# Patient Record
Sex: FEMALE | Race: WHITE | NOT HISPANIC OR LATINO | Employment: UNEMPLOYED | URBAN - METROPOLITAN AREA
[De-identification: names, ages, dates, MRNs, and addresses within clinical notes are randomized per-mention and may not be internally consistent; named-entity substitution may affect disease eponyms.]

---

## 2023-10-09 ENCOUNTER — HOSPITAL ENCOUNTER (OUTPATIENT)
Dept: CARDIOLOGY | Facility: CLINIC | Age: 48
Discharge: HOME | End: 2023-10-09
Attending: INTERNAL MEDICINE | Admitting: INTERNAL MEDICINE
Payer: COMMERCIAL

## 2023-10-09 DIAGNOSIS — Z00.6 EXAMINATION OF PARTICIPANT IN CLINICAL TRIAL: ICD-10-CM

## 2023-10-09 LAB — LVEF ECHO: NORMAL

## 2023-10-09 PROCEDURE — 93306 TTE W/DOPPLER COMPLETE: CPT | Mod: 26 | Performed by: INTERNAL MEDICINE

## 2023-10-09 PROCEDURE — 255N000002 HC RX 255 OP 636: Performed by: INTERNAL MEDICINE

## 2023-10-09 RX ADMIN — PERFLUTREN 3 ML: 6.52 INJECTION, SUSPENSION INTRAVENOUS at 09:40

## 2023-10-16 ENCOUNTER — TRANSCRIBE ORDERS (OUTPATIENT)
Dept: CARDIOLOGY | Facility: CLINIC | Age: 48
End: 2023-10-16
Payer: COMMERCIAL

## 2023-10-16 DIAGNOSIS — G89.3 CANCER ASSOCIATED PAIN: Primary | ICD-10-CM

## 2023-10-18 ENCOUNTER — HOSPITAL ENCOUNTER (OUTPATIENT)
Dept: CARDIOLOGY | Facility: CLINIC | Age: 48
Discharge: HOME | End: 2023-10-18
Attending: INTERNAL MEDICINE | Admitting: INTERNAL MEDICINE
Payer: COMMERCIAL

## 2023-10-18 LAB
ATRIAL RATE - MUSE: 76 BPM
DIASTOLIC BLOOD PRESSURE - MUSE: NORMAL MMHG
INTERPRETATION ECG - MUSE: NORMAL
P AXIS - MUSE: 61 DEGREES
PR INTERVAL - MUSE: 166 MS
QRS DURATION - MUSE: 60 MS
QT - MUSE: 398 MS
QTC - MUSE: 447 MS
R AXIS - MUSE: 71 DEGREES
SYSTOLIC BLOOD PRESSURE - MUSE: NORMAL MMHG
T AXIS - MUSE: 63 DEGREES
VENTRICULAR RATE- MUSE: 76 BPM

## 2023-10-18 PROCEDURE — 93005 ELECTROCARDIOGRAM TRACING: CPT

## 2023-10-18 PROCEDURE — 93010 ELECTROCARDIOGRAM REPORT: CPT | Mod: RTG | Performed by: INTERNAL MEDICINE

## 2023-12-21 ENCOUNTER — HOSPITAL ENCOUNTER (OUTPATIENT)
Dept: CT IMAGING | Facility: HOSPITAL | Age: 48
Discharge: HOME | End: 2023-12-21
Attending: INTERNAL MEDICINE | Admitting: INTERNAL MEDICINE
Payer: COMMERCIAL

## 2023-12-21 DIAGNOSIS — R94.5 NONSPECIFIC ABNORMAL RESULTS OF LIVER FUNCTION STUDY: ICD-10-CM

## 2023-12-21 DIAGNOSIS — C50.412 MALIGNANT NEOPLASM OF UPPER-OUTER QUADRANT OF LEFT FEMALE BREAST (H): ICD-10-CM

## 2023-12-21 DIAGNOSIS — C78.7 SECONDARY MALIGNANT NEOPLASM OF LIVER (H): ICD-10-CM

## 2023-12-21 PROCEDURE — 74177 CT ABD & PELVIS W/CONTRAST: CPT

## 2023-12-21 PROCEDURE — 250N000011 HC RX IP 250 OP 636: Performed by: INTERNAL MEDICINE

## 2023-12-21 RX ORDER — IOPAMIDOL 755 MG/ML
90 INJECTION, SOLUTION INTRAVASCULAR ONCE
Status: COMPLETED | OUTPATIENT
Start: 2023-12-21 | End: 2023-12-21

## 2023-12-21 RX ADMIN — IOPAMIDOL 90 ML: 755 INJECTION, SOLUTION INTRAVENOUS at 13:02

## 2023-12-31 ENCOUNTER — HEALTH MAINTENANCE LETTER (OUTPATIENT)
Age: 48
End: 2023-12-31

## 2024-11-08 ENCOUNTER — APPOINTMENT (OUTPATIENT)
Dept: ULTRASOUND IMAGING | Facility: CLINIC | Age: 49
End: 2024-11-08
Attending: EMERGENCY MEDICINE
Payer: COMMERCIAL

## 2024-11-08 ENCOUNTER — HOSPITAL ENCOUNTER (EMERGENCY)
Facility: CLINIC | Age: 49
Discharge: HOME | End: 2024-11-08
Attending: EMERGENCY MEDICINE | Admitting: EMERGENCY MEDICINE
Payer: COMMERCIAL

## 2024-11-08 VITALS
HEIGHT: 63 IN | HEART RATE: 97 BPM | RESPIRATION RATE: 16 BRPM | TEMPERATURE: 97.8 F | WEIGHT: 142 LBS | BODY MASS INDEX: 25.16 KG/M2 | OXYGEN SATURATION: 98 % | SYSTOLIC BLOOD PRESSURE: 97 MMHG | DIASTOLIC BLOOD PRESSURE: 60 MMHG

## 2024-11-08 DIAGNOSIS — R18.0 MALIGNANT ASCITES (H): ICD-10-CM

## 2024-11-08 LAB
% LINING CELLS, BODY FLUID: 11 %
ABSOLUTE NEUTROPHILS, BODY FLUID: 1.1 /UL
ALBUMIN SERPL BCG-MCNC: 2.6 G/DL (ref 3.5–5.2)
ALP SERPL-CCNC: 199 U/L (ref 40–150)
ALT SERPL W P-5'-P-CCNC: 9 U/L (ref 0–50)
ANION GAP SERPL CALCULATED.3IONS-SCNC: 14 MMOL/L (ref 7–15)
APPEARANCE FLD: CLEAR
AST SERPL W P-5'-P-CCNC: 79 U/L (ref 0–45)
BILIRUB DIRECT SERPL-MCNC: 0.84 MG/DL (ref 0–0.3)
BILIRUB SERPL-MCNC: 1.9 MG/DL
BUN SERPL-MCNC: 4.7 MG/DL (ref 6–20)
CALCIUM SERPL-MCNC: 8.8 MG/DL (ref 8.8–10.4)
CELL COUNT BODY FLUID SOURCE: NORMAL
CHLORIDE SERPL-SCNC: 97 MMOL/L (ref 98–107)
COLOR FLD: YELLOW
CREAT SERPL-MCNC: 0.81 MG/DL (ref 0.51–0.95)
EGFRCR SERPLBLD CKD-EPI 2021: 90 ML/MIN/1.73M2
ERYTHROCYTE [DISTWIDTH] IN BLOOD BY AUTOMATED COUNT: 13.6 % (ref 10–15)
GLUCOSE SERPL-MCNC: 102 MG/DL (ref 70–99)
HCO3 SERPL-SCNC: 23 MMOL/L (ref 22–29)
HCT VFR BLD AUTO: 36.3 % (ref 35–47)
HGB BLD-MCNC: 12.8 G/DL (ref 11.7–15.7)
LIPASE SERPL-CCNC: 19 U/L (ref 13–60)
LYMPHOCYTES NFR FLD MANUAL: 5 %
MCH RBC QN AUTO: 40.9 PG (ref 26.5–33)
MCHC RBC AUTO-ENTMCNC: 35.3 G/DL (ref 31.5–36.5)
MCV RBC AUTO: 116 FL (ref 78–100)
MONOS+MACROS NFR FLD MANUAL: 77 %
NEUTS BAND NFR FLD MANUAL: 4 %
OTHER CELLS FLD MANUAL: 3 %
PATH REV: NORMAL
PLATELET # BLD AUTO: 208 10E3/UL (ref 150–450)
POTASSIUM SERPL-SCNC: 3.5 MMOL/L (ref 3.4–5.3)
PROT SERPL-MCNC: 5.6 G/DL (ref 6.4–8.3)
RBC # BLD AUTO: 3.13 10E6/UL (ref 3.8–5.2)
SODIUM SERPL-SCNC: 134 MMOL/L (ref 135–145)
WBC # BLD AUTO: 7.5 10E3/UL (ref 4–11)
WBC # FLD AUTO: 28 /UL

## 2024-11-08 PROCEDURE — 80048 BASIC METABOLIC PNL TOTAL CA: CPT | Performed by: EMERGENCY MEDICINE

## 2024-11-08 PROCEDURE — 99285 EMERGENCY DEPT VISIT HI MDM: CPT | Mod: 25

## 2024-11-08 PROCEDURE — 250N000011 HC RX IP 250 OP 636: Performed by: EMERGENCY MEDICINE

## 2024-11-08 PROCEDURE — 36415 COLL VENOUS BLD VENIPUNCTURE: CPT | Performed by: EMERGENCY MEDICINE

## 2024-11-08 PROCEDURE — 272N000042 US PARACENTESIS WITHOUT ALBUMIN

## 2024-11-08 PROCEDURE — 85014 HEMATOCRIT: CPT | Performed by: EMERGENCY MEDICINE

## 2024-11-08 PROCEDURE — 89051 BODY FLUID CELL COUNT: CPT | Performed by: EMERGENCY MEDICINE

## 2024-11-08 PROCEDURE — 96374 THER/PROPH/DIAG INJ IV PUSH: CPT

## 2024-11-08 PROCEDURE — 82947 ASSAY GLUCOSE BLOOD QUANT: CPT | Performed by: EMERGENCY MEDICINE

## 2024-11-08 PROCEDURE — 49083 ABD PARACENTESIS W/IMAGING: CPT

## 2024-11-08 PROCEDURE — 82248 BILIRUBIN DIRECT: CPT | Performed by: EMERGENCY MEDICINE

## 2024-11-08 PROCEDURE — 83690 ASSAY OF LIPASE: CPT | Performed by: EMERGENCY MEDICINE

## 2024-11-08 PROCEDURE — 272N000710 US PARACENTESIS WITHOUT ALBUMIN

## 2024-11-08 RX ORDER — ONDANSETRON 4 MG/1
4 TABLET, ORALLY DISINTEGRATING ORAL EVERY 8 HOURS PRN
Qty: 10 TABLET | Refills: 0 | Status: ON HOLD | OUTPATIENT
Start: 2024-11-08 | End: 2024-11-14

## 2024-11-08 RX ORDER — ONDANSETRON 2 MG/ML
4 INJECTION INTRAMUSCULAR; INTRAVENOUS ONCE
Status: COMPLETED | OUTPATIENT
Start: 2024-11-08 | End: 2024-11-08

## 2024-11-08 RX ADMIN — ONDANSETRON 4 MG: 2 INJECTION, SOLUTION INTRAMUSCULAR; INTRAVENOUS at 12:49

## 2024-11-08 ASSESSMENT — COLUMBIA-SUICIDE SEVERITY RATING SCALE - C-SSRS
1. IN THE PAST MONTH, HAVE YOU WISHED YOU WERE DEAD OR WISHED YOU COULD GO TO SLEEP AND NOT WAKE UP?: NO
2. HAVE YOU ACTUALLY HAD ANY THOUGHTS OF KILLING YOURSELF IN THE PAST MONTH?: NO
6. HAVE YOU EVER DONE ANYTHING, STARTED TO DO ANYTHING, OR PREPARED TO DO ANYTHING TO END YOUR LIFE?: NO

## 2024-11-08 ASSESSMENT — ACTIVITIES OF DAILY LIVING (ADL)
ADLS_ACUITY_SCORE: 0

## 2024-11-08 NOTE — DISCHARGE INSTRUCTIONS
Fluid was drained from your abdomen today.  It is important that you follow-up with your oncologist next week as scheduled.  Return to the ER sooner for any worsening symptoms or other concerns.

## 2024-11-08 NOTE — ED PROVIDER NOTES
EMERGENCY DEPARTMENT ENCOUnter      NAME: Emilie Urena  AGE: 49 year old female  YOB: 1975  MRN: [id number]  EVALUATION DATE & TIME: 11/8/2024 10:42 AM    PCP: No Ref-Primary, Physician    ED PROVIDER: Sohan Christine DO      Chief Complaint   Patient presents with    Bloated    Abdominal Pain         FINAL IMPRESSION:  1. Malignant ascites (H)          ED COURSE & MEDICAL DECISION MAKING:    10:45 AM I met with the patient, obtained history, performed an initial exam, and discussed options and plan for diagnostics and treatment here in the ED.    The patient presented to the emergency department today complaining of abdominal pain.  She has a history of metastatic breast cancer with mets to the liver.  She has never required a paracentesis in the past but appears to have a large amount of ascites buildup today based on her abdominal distention.  Laboratory testing is notable for mild elevations in liver enzymes and bilirubin.  She was sent for paracentesis and greater than 2 L were removed.  She is feeling much better after this.  Her white count is normal and at this point I am not suspicious for SBP.  She has a follow-up appointment coming up next week with her oncologist.  I have encouraged her to keep this and return sooner for worsening symptoms or other concerns.  She is comfortable with the plan for discharge home.        Medical Decision Making  Obtained supplemental history:Supplemental history obtained?: Documented in chart  Reviewed external records: External records reviewed?: Documented in chart  Care impacted by chronic illness:Cancer/Chemotherapy and Other: GERD  Did you consider but not order tests?: Work up considered but not performed and documented in chart, if applicable  Did you interpret images independently?: Independent interpretation of ECG and images noted in documentation, when applicable.  Consultation discussion with other provider:Did you involve another provider  "(consultant, MH, pharmacy, etc.)?: No  Discharge. I prescribed additional prescription strength medication(s) as charted. I considered admission, but discharged patient after significant clinical improvement.    MIPS: Not Applicable      At the conclusion of the encounter I discussed the results of all of the tests and the disposition. The questions were answered. The patient or family acknowledged understanding and was agreeable with the care plan.         MEDICATIONS GIVEN IN THE EMERGENCY:  Medications   ondansetron (ZOFRAN) injection 4 mg (4 mg Intravenous $Given 11/8/24 7266)       NEW PRESCRIPTIONS STARTED AT TODAY'S ER VISIT  New Prescriptions    ONDANSETRON (ZOFRAN ODT) 4 MG ODT TAB    Take 1 tablet (4 mg) by mouth every 8 hours as needed for nausea.          =================================================================    HPI        Emilie Urena is a 49 year old female with a pertinent history of metastatic breast cancer, chemotherapy induced neutropenia, chemotherapy-induced thrombocytopenia, transaminitis, S/P hysterectomy, and GERD, who presents to this ED via private car for evaluation of swelling, nausea, and abdominal pain.    Patient reports she has ascites. She just had a colonoscopy and endoscopy and notes she didn't feel better after these procedures. She has been dealing with persistent nausea and dizziness before she underwent an EGD and colonoscopy and continue to have this. She noticed some swelling in her arms, legs, feet, and abdomen that has been worsening over the past 4 weeks, noting this is a new symptom. She endorses abdominal bloating and notes feeling like she is \"6 months pregnant\". No fever or chills. Per friend, the results of the EGD and colonoscopy showed the patient had a \"big inflammation in the stomach\". She indicates she isn't quite active and endorses slight shortness of breath with exertion. No shortness of breath at rest. She also feels generally weak     She has " had metastatic breast cancer the past 4 years and reports this has been worsening the past 6 weeks. She states this cancer metastasized to the bone and her liver. She follows up with her oncologist, Dr. Henny Pineda at [facility].      PAST MEDICAL HISTORY:  No past medical history on file.    PAST SURGICAL HISTORY:  No past surgical history on file.        CURRENT MEDICATIONS:    ondansetron (ZOFRAN ODT) 4 MG ODT tab        ALLERGIES:  Allergies   Allergen Reactions    Azithromycin Hives     Hives to bilateral arms     Hives to bilateral arms    Bee Venom Swelling     Severe swelling, locally.    Sertraline Nausea and Vomiting     Severe nausea    Shellfish-Derived Products Anaphylaxis    Adhesive Tape Rash     Skin Glue at port site placement.       FAMILY HISTORY:  No family history on file.    SOCIAL HISTORY:   Social History     Socioeconomic History    Marital status:      Social Drivers of Health     Financial Resource Strain: Low Risk  (5/19/2021)    Received from [facility], and [facility], [email or web address], and [facility]    Overall Financial Resource Strain (CARDIA)     Difficulty of Paying Living Expenses: Not hard at all   Food Insecurity: No Food Insecurity (5/19/2021)    Received from [facility], and [facility], [facility] [email or web address], and [facility]    Hunger Vital Sign     Worried About Running Out of Food in the Last Year: Never true     Ran Out of Food in the Last Year: Never true   Transportation Needs: No Transportation Needs (5/19/2021)    Received from [facility], and [facility], [email or web address], and [facility]    PRAPARE - Transportation     Lack of Transportation (Medical): No     Lack of Transportation (Non-Medical): No   Physical Activity: Insufficiently Active (5/19/2021)    Received from [facility], and [facility], [email or web address], and [facility]    Exercise Vital Sign     Days of Exercise  "per Week: 1 day     Minutes of Exercise per Session: 20 min   Stress: Stress Concern Present (5/19/2021)    [facility] [employer], [facility] [employer] [facility], and [facility]    [employer] [place] [employer] - Occupational Stress Questionnaire     Feeling of Stress : To some extent   Social Connections: Moderately Integrated (5/19/2021)    [facility] [employer], and [facility], [email or web address], and [facility]    Social Connection and Isolation Panel [NHANES]     Frequency of Communication with Friends and Family: More than three times a week     Frequency of Social Gatherings with Friends and Family: Three times a week     Attends Temple Services: 1 to 4 times per year     Active Member of Clubs or Organizations: No     Attends Club or Organization Meetings: Never     Marital Status:    Interpersonal Safety: Not At Risk (5/19/2021)    Received from [employer], and [facility], [email or web address], and [facility]    Humiliation, Afraid, Rape, and Kick questionnaire     Fear of Current or Ex-Partner: No     Emotionally Abused: No     Physically Abused: No     Sexually Abused: No   Housing Stability: Low Risk  (5/19/2021)    Received from [employer], and [facility], [email or web address], and [facility]    Housing Stability Vital Sign     Unable to Pay for Housing in the Last Year: No     Number of Places Lived in the Last Year: 1     Unstable Housing in the Last Year: No       VITALS:  Patient Vitals for the past 24 hrs:   BP Temp Temp src Pulse Resp SpO2 Height Weight   11/08/24 1034 115/79 97.8  F (36.6  C) Oral 115 16 96 % 1.6 m (5' 3\") 64.4 kg (142 lb)       PHYSICAL EXAM    Constitutional:  Well developed, Well nourished,  HENT:  Normocephalic, Atraumatic, Oropharynx moist, Nose normal.   Eyes:  EOMI, Conjunctiva normal, No discharge.   Respiratory:  Normal breath sounds, No respiratory distress, No " wheezing, No chest tenderness.   Cardiovascular: Mild tachycardia, Normal rhythm, No murmurs  GI: Somewhat firm, mild diffuse tenderness, no guarding.  Musculoskeletal:  No tenderness to palpation or major deformities noted.   Extremities: No lower extremity edema.  Neurologic:  Alert & oriented x 3, No focal deficits noted.   Psychiatric:  Affect normal, Judgment normal, Mood normal.        LAB:  All pertinent labs reviewed and interpreted.  Results for orders placed or performed during the hospital encounter of 11/08/24              CBC with platelets   Result Value Ref Range    WBC Count 7.5 4.0 - 11.0 10e3/uL    RBC Count 3.13 (L) 3.80 - 5.20 10e6/uL    Hemoglobin 12.8 11.7 - 15.7 g/dL    Hematocrit 36.3 35.0 - 47.0 %     (H) 78 - 100 fL    MCH 40.9 (H) 26.5 - 33.0 pg    MCHC 35.3 31.5 - 36.5 g/dL    RDW 13.6 10.0 - 15.0 %    Platelet Count 208 150 - 450 10e3/uL   Basic metabolic panel   Result Value Ref Range    Sodium 134 (L) 135 - 145 mmol/L    Potassium 3.5 3.4 - 5.3 mmol/L    Chloride 97 (L) 98 - 107 mmol/L    Carbon Dioxide (CO2) 23 22 - 29 mmol/L    Anion Gap 14 7 - 15 mmol/L    Urea Nitrogen 4.7 (L) 6.0 - 20.0 mg/dL    Creatinine 0.81 0.51 - 0.95 mg/dL    GFR Estimate 90 >60 mL/min/1.73m2    Calcium 8.8 8.8 - 10.4 mg/dL    Glucose 102 (H) 70 - 99 mg/dL   Hepatic function panel   Result Value Ref Range    Protein Total 5.6 (L) 6.4 - 8.3 g/dL    Albumin 2.6 (L) 3.5 - 5.2 g/dL    Bilirubin Total 1.9 (H) <=1.2 mg/dL    Alkaline Phosphatase 199 (H) 40 - 150 U/L    AST 79 (H) 0 - 45 U/L    ALT 9 0 - 50 U/L    Bilirubin Direct 0.84 (H) 0.00 - 0.30 mg/dL   Result Value Ref Range    Lipase 19 13 - 60 U/L   Cell Count Body Fluid   Result Value Ref Range    Color Yellow Colorless, Yellow    Clarity Clear Clear    Cell Count Fluid Source Abdomen     Total Nucleated Cells 28 /uL       RADIOLOGY:  I have independently reviewed and interpreted the above imaging, pending the final radiology read.  US Paracentesis  without Albumin   Final Result   IMPRESSION:   1.  Status post ultrasound-guided paracentesis.      Reference CPT Code: 81485            I, Carolyn Candelaria, am serving as a scribe to document services personally performed by Dr. Christine based on my observation and the provider's statements to me. I, Sohan Christine, DO attest that Carolyn Candelaria is acting in a scribe capacity, has observed my performance of the services and has documented them in accordance with my direction.    Sohan Christine DO  Emergency Medicine  [facility] EMERGENCY ROOM  [facility] [facility] [id number]  [phone]  Dept: [phone]     Sohan Christine DO  11/08/24 1334

## 2024-11-08 NOTE — ED TRIAGE NOTES
"Pt presents to the ED with complaints of swelling in her arms, legs and abdomen that has been getting worse over the last 4 weeks. Abdominal swelling causing discomfort and pt having trouble keeping food and fluids down. + nausea and dizziness. Pt states she will drink and \"throw it right back up\". Hx of metastatic breast cancer. Pt states increase in weight from 127 lbs to 142 lbs. Had a colonoscopy and endoscopy yesterday which pt stated her stomach was \"inflamed\".     Triage Assessment (Adult)       Row Name 11/08/24 1037          Triage Assessment    Airway WDL WDL        Respiratory WDL    Respiratory WDL WDL        Skin Circulation/Temperature WDL    Skin Circulation/Temperature WDL WDL        Cardiac WDL    Cardiac WDL X;rhythm     Pulse Rate & Regularity tachycardic        Peripheral/Neurovascular WDL    Peripheral Neurovascular WDL WDL        Cognitive/Neuro/Behavioral WDL    Cognitive/Neuro/Behavioral WDL WDL                     "

## 2024-11-11 ENCOUNTER — APPOINTMENT (OUTPATIENT)
Dept: CT IMAGING | Facility: CLINIC | Age: 49
End: 2024-11-11
Attending: EMERGENCY MEDICINE
Payer: COMMERCIAL

## 2024-11-11 ENCOUNTER — HOSPITAL ENCOUNTER (INPATIENT)
Facility: HOSPITAL | Age: 49
LOS: 2 days | Discharge: HOME | DRG: 435 | End: 2024-11-14
Attending: HOSPITALIST | Admitting: HOSPITALIST
Payer: COMMERCIAL

## 2024-11-11 ENCOUNTER — HOSPITAL ENCOUNTER (EMERGENCY)
Facility: CLINIC | Age: 49
Discharge: ANOTHER HOSPITAL | End: 2024-11-11
Attending: EMERGENCY MEDICINE | Admitting: EMERGENCY MEDICINE
Payer: COMMERCIAL

## 2024-11-11 VITALS
HEIGHT: 63 IN | BODY MASS INDEX: 25.16 KG/M2 | HEART RATE: 102 BPM | DIASTOLIC BLOOD PRESSURE: 73 MMHG | OXYGEN SATURATION: 98 % | RESPIRATION RATE: 16 BRPM | WEIGHT: 142 LBS | TEMPERATURE: 98 F | SYSTOLIC BLOOD PRESSURE: 108 MMHG

## 2024-11-11 DIAGNOSIS — R18.0 MALIGNANT ASCITES (H): ICD-10-CM

## 2024-11-11 DIAGNOSIS — R62.7 FAILURE TO THRIVE IN ADULT: Primary | ICD-10-CM

## 2024-11-11 DIAGNOSIS — R60.1 ANASARCA: ICD-10-CM

## 2024-11-11 DIAGNOSIS — R11.0 NAUSEA: ICD-10-CM

## 2024-11-11 DIAGNOSIS — R19.7 DIARRHEA, UNSPECIFIED TYPE: ICD-10-CM

## 2024-11-11 PROBLEM — E87.1 HYPONATREMIA: Status: ACTIVE | Noted: 2024-11-11

## 2024-11-11 PROBLEM — G62.9 PERIPHERAL POLYNEUROPATHY: Status: ACTIVE | Noted: 2024-11-11

## 2024-11-11 PROBLEM — C50.919: Status: ACTIVE | Noted: 2024-11-11

## 2024-11-11 LAB
ALBUMIN SERPL BCG-MCNC: 2.7 G/DL (ref 3.5–5.2)
ALBUMIN SERPL BCG-MCNC: 2.8 G/DL (ref 3.5–5.2)
ALP SERPL-CCNC: 211 U/L (ref 40–150)
ALP SERPL-CCNC: 227 U/L (ref 40–150)
ALT SERPL W P-5'-P-CCNC: 12 U/L (ref 0–50)
ALT SERPL W P-5'-P-CCNC: ABNORMAL U/L
ANION GAP SERPL CALCULATED.3IONS-SCNC: 1 MMOL/L (ref 7–15)
ANION GAP SERPL CALCULATED.3IONS-SCNC: 14 MMOL/L (ref 7–15)
APTT PPP: 31 SECONDS (ref 22–38)
AST SERPL W P-5'-P-CCNC: 103 U/L (ref 0–45)
AST SERPL W P-5'-P-CCNC: ABNORMAL U/L
B-OH-BUTYR SERPL-SCNC: 2.8 MMOL/L
BASE EXCESS BLDV CALC-SCNC: 3.1 MMOL/L (ref -3–3)
BASOPHILS # BLD AUTO: 0 10E3/UL (ref 0–0.2)
BASOPHILS NFR BLD AUTO: 1 %
BILIRUB DIRECT SERPL-MCNC: 1.25 MG/DL (ref 0–0.3)
BILIRUB SERPL-MCNC: 2 MG/DL
BILIRUB SERPL-MCNC: 2.2 MG/DL
BUN SERPL-MCNC: 7.8 MG/DL (ref 6–20)
BUN SERPL-MCNC: 8.4 MG/DL (ref 6–20)
CALCIUM SERPL-MCNC: 7.8 MG/DL (ref 8.8–10.4)
CALCIUM SERPL-MCNC: 9.6 MG/DL (ref 8.8–10.4)
CHLORIDE SERPL-SCNC: 91 MMOL/L (ref 98–107)
CHLORIDE SERPL-SCNC: 96 MMOL/L (ref 98–107)
CREAT SERPL-MCNC: 0.66 MG/DL (ref 0.51–0.95)
CREAT SERPL-MCNC: 0.79 MG/DL (ref 0.51–0.95)
EGFRCR SERPLBLD CKD-EPI 2021: >90 ML/MIN/1.73M2
EGFRCR SERPLBLD CKD-EPI 2021: >90 ML/MIN/1.73M2
EOSINOPHIL # BLD AUTO: 0.2 10E3/UL (ref 0–0.7)
EOSINOPHIL NFR BLD AUTO: 3 %
ERYTHROCYTE [DISTWIDTH] IN BLOOD BY AUTOMATED COUNT: 14.5 % (ref 10–15)
GLUCOSE BLDC GLUCOMTR-MCNC: 116 MG/DL (ref 70–99)
GLUCOSE SERPL-MCNC: 80 MG/DL (ref 70–99)
GLUCOSE SERPL-MCNC: 86 MG/DL (ref 70–99)
HCO3 BLDV-SCNC: 29 MMOL/L (ref 21–28)
HCO3 SERPL-SCNC: 23 MMOL/L (ref 22–29)
HCO3 SERPL-SCNC: 23 MMOL/L (ref 22–29)
HCT VFR BLD AUTO: 40.1 % (ref 35–47)
HGB BLD-MCNC: 14.2 G/DL (ref 11.7–15.7)
HOLD SPECIMEN: NORMAL
IMM GRANULOCYTES # BLD: 0 10E3/UL
IMM GRANULOCYTES NFR BLD: 1 %
INR PPP: 1.54 (ref 0.85–1.15)
LACTATE SERPL-SCNC: 2.2 MMOL/L (ref 0.7–2)
LIPASE SERPL-CCNC: 29 U/L (ref 13–60)
LYMPHOCYTES # BLD AUTO: 0.7 10E3/UL (ref 0.8–5.3)
LYMPHOCYTES NFR BLD AUTO: 12 %
MAGNESIUM SERPL-MCNC: 1.9 MG/DL (ref 1.7–2.3)
MCH RBC QN AUTO: 41 PG (ref 26.5–33)
MCHC RBC AUTO-ENTMCNC: 35.4 G/DL (ref 31.5–36.5)
MCV RBC AUTO: 116 FL (ref 78–100)
MONOCYTES # BLD AUTO: 0.6 10E3/UL (ref 0–1.3)
MONOCYTES NFR BLD AUTO: 9 %
NEUTROPHILS # BLD AUTO: 4.5 10E3/UL (ref 1.6–8.3)
NEUTROPHILS NFR BLD AUTO: 75 %
NRBC # BLD AUTO: 0 10E3/UL
NRBC BLD AUTO-RTO: 0 /100
O2/TOTAL GAS SETTING VFR VENT: 21 %
OXYHGB MFR BLDV: 19 % (ref 70–75)
PCO2 BLDV: 45 MM HG (ref 40–50)
PH BLDV: 7.41 [PH] (ref 7.32–7.43)
PHOSPHATE SERPL-MCNC: 2.9 MG/DL (ref 2.5–4.5)
PLATELET # BLD AUTO: 229 10E3/UL (ref 150–450)
PO2 BLDV: 18 MM HG (ref 25–47)
POTASSIUM SERPL-SCNC: 3.5 MMOL/L (ref 3.4–5.3)
POTASSIUM SERPL-SCNC: >10 MMOL/L (ref 3.4–5.3)
PROT SERPL-MCNC: 6 G/DL (ref 6.4–8.3)
PROT SERPL-MCNC: 7.5 G/DL (ref 6.4–8.3)
RBC # BLD AUTO: 3.46 10E6/UL (ref 3.8–5.2)
SAO2 % BLDV: 19.3 % (ref 70–75)
SODIUM SERPL-SCNC: 115 MMOL/L (ref 135–145)
SODIUM SERPL-SCNC: 133 MMOL/L (ref 135–145)
T4 FREE SERPL-MCNC: 1.53 NG/DL (ref 0.9–1.7)
TSH SERPL DL<=0.005 MIU/L-ACNC: 4.7 UIU/ML (ref 0.3–4.2)
WBC # BLD AUTO: 6 10E3/UL (ref 4–11)

## 2024-11-11 PROCEDURE — 82805 BLOOD GASES W/O2 SATURATION: CPT | Performed by: EMERGENCY MEDICINE

## 2024-11-11 PROCEDURE — G0378 HOSPITAL OBSERVATION PER HR: HCPCS

## 2024-11-11 PROCEDURE — 82247 BILIRUBIN TOTAL: CPT | Performed by: EMERGENCY MEDICINE

## 2024-11-11 PROCEDURE — 99285 EMERGENCY DEPT VISIT HI MDM: CPT | Mod: 25

## 2024-11-11 PROCEDURE — 85025 COMPLETE CBC W/AUTO DIFF WBC: CPT | Performed by: EMERGENCY MEDICINE

## 2024-11-11 PROCEDURE — 36415 COLL VENOUS BLD VENIPUNCTURE: CPT | Performed by: HOSPITALIST

## 2024-11-11 PROCEDURE — 250N000013 HC RX MED GY IP 250 OP 250 PS 637: Performed by: HOSPITALIST

## 2024-11-11 PROCEDURE — 85610 PROTHROMBIN TIME: CPT | Performed by: EMERGENCY MEDICINE

## 2024-11-11 PROCEDURE — 250N000011 HC RX IP 250 OP 636: Performed by: EMERGENCY MEDICINE

## 2024-11-11 PROCEDURE — 85014 HEMATOCRIT: CPT | Performed by: EMERGENCY MEDICINE

## 2024-11-11 PROCEDURE — 36415 COLL VENOUS BLD VENIPUNCTURE: CPT | Performed by: EMERGENCY MEDICINE

## 2024-11-11 PROCEDURE — 84443 ASSAY THYROID STIM HORMONE: CPT | Performed by: EMERGENCY MEDICINE

## 2024-11-11 PROCEDURE — 96374 THER/PROPH/DIAG INJ IV PUSH: CPT

## 2024-11-11 PROCEDURE — 258N000003 HC RX IP 258 OP 636: Performed by: HOSPITALIST

## 2024-11-11 PROCEDURE — 82010 KETONE BODYS QUAN: CPT | Performed by: HOSPITALIST

## 2024-11-11 PROCEDURE — 84100 ASSAY OF PHOSPHORUS: CPT | Performed by: HOSPITALIST

## 2024-11-11 PROCEDURE — 250N000011 HC RX IP 250 OP 636: Performed by: HOSPITALIST

## 2024-11-11 PROCEDURE — 96372 THER/PROPH/DIAG INJ SC/IM: CPT | Performed by: HOSPITALIST

## 2024-11-11 PROCEDURE — 99223 1ST HOSP IP/OBS HIGH 75: CPT | Performed by: HOSPITALIST

## 2024-11-11 PROCEDURE — 84075 ASSAY ALKALINE PHOSPHATASE: CPT | Performed by: EMERGENCY MEDICINE

## 2024-11-11 PROCEDURE — 82962 GLUCOSE BLOOD TEST: CPT

## 2024-11-11 PROCEDURE — 83605 ASSAY OF LACTIC ACID: CPT | Performed by: EMERGENCY MEDICINE

## 2024-11-11 PROCEDURE — 84155 ASSAY OF PROTEIN SERUM: CPT | Performed by: EMERGENCY MEDICINE

## 2024-11-11 PROCEDURE — 82248 BILIRUBIN DIRECT: CPT | Performed by: EMERGENCY MEDICINE

## 2024-11-11 PROCEDURE — 83690 ASSAY OF LIPASE: CPT | Performed by: EMERGENCY MEDICINE

## 2024-11-11 PROCEDURE — 83735 ASSAY OF MAGNESIUM: CPT | Performed by: HOSPITALIST

## 2024-11-11 PROCEDURE — 71260 CT THORAX DX C+: CPT

## 2024-11-11 PROCEDURE — 85730 THROMBOPLASTIN TIME PARTIAL: CPT | Performed by: EMERGENCY MEDICINE

## 2024-11-11 PROCEDURE — 82310 ASSAY OF CALCIUM: CPT | Performed by: EMERGENCY MEDICINE

## 2024-11-11 PROCEDURE — 84439 ASSAY OF FREE THYROXINE: CPT | Performed by: EMERGENCY MEDICINE

## 2024-11-11 PROCEDURE — 74177 CT ABD & PELVIS W/CONTRAST: CPT

## 2024-11-11 RX ORDER — POLYETHYLENE GLYCOL 3350 17 G/17G
17 POWDER, FOR SOLUTION ORAL 2 TIMES DAILY PRN
Status: DISCONTINUED | OUTPATIENT
Start: 2024-11-11 | End: 2024-11-14 | Stop reason: HOSPADM

## 2024-11-11 RX ORDER — ONDANSETRON 2 MG/ML
4 INJECTION INTRAMUSCULAR; INTRAVENOUS ONCE
Status: COMPLETED | OUTPATIENT
Start: 2024-11-11 | End: 2024-11-11

## 2024-11-11 RX ORDER — ONDANSETRON 2 MG/ML
4 INJECTION INTRAMUSCULAR; INTRAVENOUS EVERY 8 HOURS PRN
Status: DISCONTINUED | OUTPATIENT
Start: 2024-11-11 | End: 2024-11-11 | Stop reason: HOSPADM

## 2024-11-11 RX ORDER — FENTANYL CITRATE 50 UG/ML
50 INJECTION, SOLUTION INTRAMUSCULAR; INTRAVENOUS ONCE
Status: COMPLETED | OUTPATIENT
Start: 2024-11-11 | End: 2024-11-11

## 2024-11-11 RX ORDER — ONDANSETRON 2 MG/ML
4 INJECTION INTRAMUSCULAR; INTRAVENOUS
Status: DISCONTINUED | OUTPATIENT
Start: 2024-11-11 | End: 2024-11-14 | Stop reason: HOSPADM

## 2024-11-11 RX ORDER — NICOTINE POLACRILEX 4 MG
15-30 LOZENGE BUCCAL
Status: DISCONTINUED | OUTPATIENT
Start: 2024-11-11 | End: 2024-11-14 | Stop reason: HOSPADM

## 2024-11-11 RX ORDER — BISACODYL 10 MG
10 SUPPOSITORY, RECTAL RECTAL DAILY PRN
Status: DISCONTINUED | OUTPATIENT
Start: 2024-11-11 | End: 2024-11-14 | Stop reason: HOSPADM

## 2024-11-11 RX ORDER — DEXTROSE MONOHYDRATE 25 G/50ML
25-50 INJECTION, SOLUTION INTRAVENOUS
Status: DISCONTINUED | OUTPATIENT
Start: 2024-11-11 | End: 2024-11-14 | Stop reason: HOSPADM

## 2024-11-11 RX ORDER — IOPAMIDOL 755 MG/ML
90 INJECTION, SOLUTION INTRAVASCULAR ONCE
Status: COMPLETED | OUTPATIENT
Start: 2024-11-11 | End: 2024-11-11

## 2024-11-11 RX ORDER — HYDROXYZINE HCL 10 MG/5 ML
25 SOLUTION, ORAL ORAL EVERY 4 HOURS PRN
Status: DISCONTINUED | OUTPATIENT
Start: 2024-11-11 | End: 2024-11-14 | Stop reason: HOSPADM

## 2024-11-11 RX ORDER — THIAMINE HYDROCHLORIDE 100 MG/ML
100 INJECTION, SOLUTION INTRAMUSCULAR; INTRAVENOUS DAILY
Status: DISCONTINUED | OUTPATIENT
Start: 2024-11-11 | End: 2024-11-12

## 2024-11-11 RX ORDER — GABAPENTIN 300 MG/1
300 CAPSULE ORAL AT BEDTIME
Status: DISCONTINUED | OUTPATIENT
Start: 2024-11-11 | End: 2024-11-14 | Stop reason: HOSPADM

## 2024-11-11 RX ORDER — FENTANYL CITRATE 50 UG/ML
50 INJECTION, SOLUTION INTRAMUSCULAR; INTRAVENOUS
Status: DISCONTINUED | OUTPATIENT
Start: 2024-11-11 | End: 2024-11-11 | Stop reason: HOSPADM

## 2024-11-11 RX ORDER — ACETAMINOPHEN 650 MG/1
650 SUPPOSITORY RECTAL EVERY 8 HOURS PRN
Status: DISCONTINUED | OUTPATIENT
Start: 2024-11-11 | End: 2024-11-14 | Stop reason: HOSPADM

## 2024-11-11 RX ORDER — AMOXICILLIN 250 MG
1 CAPSULE ORAL 2 TIMES DAILY PRN
Status: DISCONTINUED | OUTPATIENT
Start: 2024-11-11 | End: 2024-11-14 | Stop reason: HOSPADM

## 2024-11-11 RX ORDER — AMOXICILLIN 250 MG
2 CAPSULE ORAL 2 TIMES DAILY PRN
Status: DISCONTINUED | OUTPATIENT
Start: 2024-11-11 | End: 2024-11-14 | Stop reason: HOSPADM

## 2024-11-11 RX ORDER — PROCHLORPERAZINE MALEATE 10 MG
10 TABLET ORAL EVERY 6 HOURS PRN
Status: DISCONTINUED | OUTPATIENT
Start: 2024-11-11 | End: 2024-11-14 | Stop reason: HOSPADM

## 2024-11-11 RX ORDER — LOPERAMIDE HCL 1 MG/7.5ML
2 SOLUTION ORAL 4 TIMES DAILY PRN
Status: DISCONTINUED | OUTPATIENT
Start: 2024-11-11 | End: 2024-11-12

## 2024-11-11 RX ORDER — ACETAMINOPHEN 325 MG/1
650 TABLET ORAL EVERY 8 HOURS PRN
Status: DISCONTINUED | OUTPATIENT
Start: 2024-11-11 | End: 2024-11-14 | Stop reason: HOSPADM

## 2024-11-11 RX ORDER — DEXTROSE MONOHYDRATE AND SODIUM CHLORIDE 5; .9 G/100ML; G/100ML
INJECTION, SOLUTION INTRAVENOUS CONTINUOUS
Status: DISCONTINUED | OUTPATIENT
Start: 2024-11-11 | End: 2024-11-12

## 2024-11-11 RX ORDER — GABAPENTIN 300 MG/1
300 CAPSULE ORAL AT BEDTIME
COMMUNITY
Start: 2024-10-16

## 2024-11-11 RX ORDER — ONDANSETRON 2 MG/ML
4 INJECTION INTRAMUSCULAR; INTRAVENOUS EVERY 6 HOURS PRN
Status: DISCONTINUED | OUTPATIENT
Start: 2024-11-11 | End: 2024-11-14 | Stop reason: HOSPADM

## 2024-11-11 RX ADMIN — IOPAMIDOL 90 ML: 755 INJECTION, SOLUTION INTRAVENOUS at 13:37

## 2024-11-11 RX ADMIN — DEXTROSE AND SODIUM CHLORIDE: 5; 900 INJECTION, SOLUTION INTRAVENOUS at 17:27

## 2024-11-11 RX ADMIN — HYDROXYZINE HYDROCHLORIDE 25 MG: 10 SOLUTION ORAL at 21:37

## 2024-11-11 RX ADMIN — THIAMINE HYDROCHLORIDE 100 MG: 100 INJECTION, SOLUTION INTRAMUSCULAR; INTRAVENOUS at 20:22

## 2024-11-11 RX ADMIN — LOPERAMIDE HCL 2 MG: 1 SOLUTION ORAL at 21:37

## 2024-11-11 RX ADMIN — ONDANSETRON 4 MG: 2 INJECTION INTRAMUSCULAR; INTRAVENOUS at 16:20

## 2024-11-11 ASSESSMENT — ACTIVITIES OF DAILY LIVING (ADL)
ADLS_ACUITY_SCORE: 0

## 2024-11-11 ASSESSMENT — COLUMBIA-SUICIDE SEVERITY RATING SCALE - C-SSRS
2. HAVE YOU ACTUALLY HAD ANY THOUGHTS OF KILLING YOURSELF IN THE PAST MONTH?: NO
1. IN THE PAST MONTH, HAVE YOU WISHED YOU WERE DEAD OR WISHED YOU COULD GO TO SLEEP AND NOT WAKE UP?: NO
1. IN THE PAST MONTH, HAVE YOU WISHED YOU WERE DEAD OR WISHED YOU COULD GO TO SLEEP AND NOT WAKE UP?: NO
6. HAVE YOU EVER DONE ANYTHING, STARTED TO DO ANYTHING, OR PREPARED TO DO ANYTHING TO END YOUR LIFE?: NO
2. HAVE YOU ACTUALLY HAD ANY THOUGHTS OF KILLING YOURSELF IN THE PAST MONTH?: NO
6. HAVE YOU EVER DONE ANYTHING, STARTED TO DO ANYTHING, OR PREPARED TO DO ANYTHING TO END YOUR LIFE?: NO

## 2024-11-11 NOTE — PHARMACY-ADMISSION MEDICATION HISTORY
Admission medication history completed at [facility]. Please see Pharmacist Admission Medication History note from 11/11/2024.

## 2024-11-11 NOTE — ED TRIAGE NOTES
Pt was here Friday and had paracentesis, and thought she would feel better but does not feel better,  diarrhea, nausea, no appetite for over a month. Pt taking imodium and still having diarrhea. Pt Hx of breast cancer with mets and think has moved into liver. Pt having abdominal bloating again and feels like filling up with fluid again. Denies fevers. Pt on oral chemo meds.pt declined admission Friday. Appears jaundiced in triage.

## 2024-11-11 NOTE — ED PROVIDER NOTES
EMERGENCY DEPARTMENT ENCOUNTER      NAME: Emilie Urena  AGE: 49 year old female  YOB: 1975  MRN: [id number]  EVALUATION DATE & TIME: No admission date for patient encounter.    PCP: Kalia Morocho    ED PROVIDER: Leslee Hermosillo M.D.      Chief Complaint   Patient presents with    Abdominal Pain       FINAL IMPRESSION:  1. Anasarca    2. Malignant ascites (H)        ED COURSE & MEDICAL DECISION MAKIN. Generalized fatigue, decreased PO, nausea, diarrhea, abdominal distension, hx of metastatic breast ca.  Last paracentesis in ED 24, 2L removed. Re-accumulation of fluid present but not requiring emergent therapeutic tap today. No severe abdominal pain or fevers, low concern for SBP clinically.  Labs ordered including CMP, INR, PTT, CBC, direct bilirubin, TSH, lipase, lactic acid.   I reviewed blood work significant for liver enzymes trending upwards but similar to previous:   (199 on 24),  (79 on 24), bilirubin 2.2 (1.9), direct 1.25 (0.84), mildly elevated lactic acid at 2.2, normal WBC and hemoglobin, INR 1.54, UA no ketones, neg for nitrite, LE.   I ordered a CT C/A/P for evaluation of metastatic disease given her continued ascites. CT shows diffuse heterogenous liver enhancement, differentials include rapidly evolving hepatic metastatic disease vs hepatitis or fibrosis vs heterogenous steatosis. Increased mild to moderate ascites, left groundglass changes, minimal, suggesting infectious vs inflammatory changes, tiny left pleural effusion.  I doubt acute infectious process such as pneumonia, clinically.  I ordered PRN meds but patient declined.  No hospital beds at [facility], I transferred patient to medicine after speaking with oncology. No additional testing needed at this ED.      11:09 AM I met with the patient to gather history and to perform my initial exam. I discussed the plan for care while in the Emergency Department.  12:00 PM Rechecked and  updated the patient. Patient is interested in possible transfer.  2:16 PM Patient is able to transfer to [facility], awaiting call from accepting physician.  2:28 PM Spoke with Dr. Gardiner, hospitalist at [facility], who accepts the patient transfer.  ED Course as of 11/12/24 0749   Mon Nov 11, 2024   1433 I spoke to Oncology   2:37 PM I spoke to Dr. Noel from [facility] who is aware of transfer to [place].  Pertinent Labs & Imaging studies reviewed. (See chart for details).    Medical Decision Making  Obtained supplemental history:Supplemental history obtained?: Family Member/Significant Other  Reviewed external records: External records reviewed?: Documented in chart I reviewed [facility] continuity of care document from 11/11/24.  Care impacted by chronic illness:Cancer/Chemotherapy  Did you consider but not order tests?: Work up considered but not performed and documented in chart, if applicable  Did you interpret images independently?: Independent interpretation of ECG and images noted in documentation, when applicable.  Consultation discussion with other provider:Did you involve another provider (consultant, , pharmacy, etc.)?: I discussed the care with another health care provider, see documentation for details.  Admit.    MIPS: Not Applicable        At the conclusion of the encounter I discussed the results of all of the tests and the disposition. The questions were answered. The patient or family acknowledged understanding and was agreeable with the care plan.      CRITICAL CARE:  NA    HPI    Patient information was obtained from: Patient    Use of : N/A.       Emilie Urena is a 49 year old female who presents abdominal pain    Per Chart Review: Patient seen at [facility] ED on 11/8/24 presenting with abdominal pain. Patient with a history of metastatic breast cancer with liver metastases. On exam, patient's abdomen tender and somewhat firm consistent with ascites. Patient underwent  paracentesis and had 2 liters of fluid were removed. WBC normal, low suspicion for SBP. Scheduled to see oncologist the following week. Patient offered admission but declined, discharged with prescription for Zofran.  11/5/24 goserelin subcutaneous  10/16/24 fulvestrant IM  10/4/24 granisetron IV   Alpelisib PO daily      The patient is returning to the ED today with recurrence of abdominal pain she was seen in the ED for a few days ago. After undergoing paracentesis her abdomen was less distended, but the fluid started to accumulate again starting the next day. She estimates she is up about 15 lbs from her baseline weight. She endorses ongoing nausea, diarrhea, and decreased appetite for the past month. She has been using imodium for her diarrhea. Yesterday she had 4 episodes of diarrhea. She has not had any fevers. The patient is currently on oral chemotherapy.    REVIEW OF SYSTEMS  All other systems negative unless noted in HPI.    PAST MEDICAL HISTORY:  Past Medical History:   Diagnosis Date    Breast cancer (H)        PAST SURGICAL HISTORY:  No past surgical history on file.      CURRENT MEDICATIONS:    No current facility-administered medications for this encounter.     No current outpatient medications on file.     Facility-Administered Medications Ordered in Other Encounters   Medication Dose Route Frequency Provider Last Rate Last Admin    acetaminophen (TYLENOL) tablet 650 mg  650 mg Oral Q8H PRN Montserrat Gardiner MD        Or    acetaminophen (TYLENOL) Suppository 650 mg  650 mg Rectal Q8H PRN Montserrat Gardiner MD        bisacodyl (DULCOLAX) suppository 10 mg  10 mg Rectal Daily PRN Montserrat Gardiner MD        dextrose 5% and 0.9% NaCl infusion   Intravenous Continuous Montserrat Gardiner MD 75 mL/hr at 11/12/24 0702 New Bag at 11/12/24 0702    glucose gel 15-30 g  15-30 g Oral Q15 Min PRN Montserrat Gardiner MD        Or    dextrose 50 % injection 25-50 mL  25-50 mL Intravenous Q15 Min PRN Montserrat Gardiner MD        Or     glucagon injection 1 mg  1 mg Subcutaneous Q15 Min PRN Montserrat Gardiner MD        gabapentin (NEURONTIN) capsule 300 mg  300 mg Oral At Bedtime Montserrat Gardiner MD        hydrOXYzine (ATARAX) syrup 25 mg  25 mg Oral Q4H PRN Montserrat Gardiner MD   25 mg at 11/11/24 2137    insulin aspart (NovoLOG) injection (RAPID ACTING)  1-3 Units Subcutaneous TID AC Montserrat Gardiner MD        loperamide (IMODIUM) liquid 2 mg  2 mg Oral 4x Daily PRN Montserrat Gardiner MD   2 mg at 11/11/24 2137    melatonin tablet 5 mg  5 mg Oral At Bedtime PRN Montserrat Gardiner MD        ondansetron (ZOFRAN) injection 4 mg  4 mg Intravenous Q6H PRN Montserrat Gardiner MD   4 mg at 11/11/24 1620    ondansetron (ZOFRAN) injection 4 mg  4 mg Intravenous TID AC Montserrat Gardiner MD        polyethylene glycol (MIRALAX) Packet 17 g  17 g Oral BID PRN Montserrat Gardiner MD        prochlorperazine (COMPAZINE) injection 10 mg  10 mg Intravenous Q6H PRN Montserrat Gardiner MD        Or    prochlorperazine (COMPAZINE) tablet 10 mg  10 mg Oral Q6H PRN Montserrat Gardiner MD        prochlorperazine (COMPAZINE) injection 5 mg  5 mg Intravenous Q6H PRN Montserrat Gardiner MD        promethazine (PHENERGAN) 6.25 mg in sodium chloride 0.9 % 55 mL intermittent infusion  6.25 mg Intravenous Q6H PRN Montserrat Gardiner MD        senritu-docusate (SENOKOT-S/PERICOLACE) 8.6-50 MG per tablet 1 tablet  1 tablet Oral BID PRN Montserrat Gardiner MD        Or    senna-docusate (SENOKOT-S/PERICOLACE) 8.6-50 MG per tablet 2 tablet  2 tablet Oral BID PRN Montserrat Gardiner MD        thiamine (B-1) injection 100 mg  100 mg Intramuscular Daily Montserrat Gardiner MD   100 mg at 11/11/24 2022    Followed by    [START ON 11/16/2024] thiamine (B-1) tablet 100 mg  100 mg Oral Daily Montserrat Gardiner MD             ALLERGIES:  Allergies   Allergen Reactions    Azithromycin Hives     Hives to bilateral arms     Hives to bilateral arms    Bee Venom Swelling     Severe swelling, locally.    Sertraline Nausea and Vomiting     Severe nausea     Shellfish-Derived Products Anaphylaxis    Adhesive Tape Rash     Skin Glue at port site placement.       FAMILY HISTORY:  No family history on file.    SOCIAL HISTORY:  Social History     Socioeconomic History    Marital status:      Social Drivers of Health     Financial Resource Strain: Low Risk  (5/19/2021)    Received from [facility], and [facility], [facility], and [facility]    Overall Financial Resource Strain (CARDIA)     Difficulty of Paying Living Expenses: Not hard at all   Food Insecurity: No Food Insecurity (5/19/2021)    Received from [facility], and [facility], [facility], and [facility]    Hunger Vital Sign     Worried About Running Out of Food in the Last Year: Never true     Ran Out of Food in the Last Year: Never true   Transportation Needs: No Transportation Needs (5/19/2021)    Received from [facility], and [facility], [facility], and [facility]    PRAPARE - Transportation     Lack of Transportation (Medical): No     Lack of Transportation (Non-Medical): No   Physical Activity: Insufficiently Active (5/19/2021)    Received from [facility], and [facility], [facility], and [facility]    Exercise Vital Sign     Days of Exercise per Week: 1 day     Minutes of Exercise per Session: 20 min   Stress: Stress Concern Present (5/19/2021)    Received from [facility], and [facility], [facility], and [facility]    [employer] - Occupational Stress Questionnaire     Feeling of Stress : To some extent   Social Connections: Moderately Integrated (5/19/2021)    Received from [facility], and [facility], [facility], and [facility]    Social Connection and Isolation Panel [NHANES]     Frequency of Communication with Friends and Family: More than three times a week     Frequency of Social Gatherings with Friends and  "Family: Three times a week     Attends Mosque Services: 1 to 4 times per year     Active Member of Clubs or Organizations: No     Attends Club or Organization Meetings: Never     Marital Status:    Interpersonal Safety: Not At Risk (5/19/2021)    Received from [employer], and [employer], [employer], and [employer]    Humiliation, Afraid, Rape, and Kick questionnaire     Fear of Current or Ex-Partner: No     Emotionally Abused: No     Physically Abused: No     Sexually Abused: No   Housing Stability: Low Risk  (5/19/2021)    Received from [employer], and [employer], [employer], and [employer]    Housing Stability Vital Sign     Unable to Pay for Housing in the Last Year: No     Number of Places Lived in the Last Year: 1     Unstable Housing in the Last Year: No       VITALS:  Patient Vitals for the past 24 hrs:   BP Temp Temp src Pulse Resp SpO2 Height Weight   11/11/24 1515 108/73 -- -- 102 16 98 % -- --   11/11/24 1500 107/73 -- -- 97 14 98 % -- --   11/11/24 1445 98/62 -- -- 98 15 99 % -- --   11/11/24 1430 96/57 -- -- 100 16 100 % -- --   11/11/24 1415 114/74 -- -- 100 15 98 % -- --   11/11/24 1400 118/72 -- -- 101 16 97 % -- --   11/11/24 1345 124/76 -- -- 101 17 -- -- --   11/11/24 1330 110/71 -- -- 100 16 98 % -- --   11/11/24 1315 102/64 -- -- 98 16 97 % -- --   11/11/24 1300 102/66 -- -- 98 16 98 % -- --   11/11/24 1245 103/65 -- -- 95 16 100 % -- --   11/11/24 1230 109/69 -- -- 97 17 -- -- --   11/11/24 1215 102/73 -- -- 103 16 99 % -- --   11/11/24 1200 101/65 -- -- 96 15 100 % -- --   11/11/24 1145 105/69 -- -- 96 15 99 % -- --   11/11/24 1130 126/84 -- -- 103 -- 100 % -- --   11/11/24 1013 127/73 98  F (36.7  C) Oral 116 14 98 % 1.6 m (5' 3\") 64.4 kg (142 lb)       PHYSICAL EXAM    VITAL SIGNS: /73   Pulse 102   Temp 98  F (36.7  C) (Oral)   Resp 16   Ht 1.6 m (5' 3\")   Wt 64.4 kg (142 lb)   SpO2 98%   BMI 25.15 kg/m  "   Physical Exam  Vitals and nursing note reviewed.   Constitutional:       Comments: Chronically ill appearing   HENT:      Head: Normocephalic and atraumatic.   Eyes:      General: No scleral icterus.        Right eye: No discharge.         Left eye: No discharge.      Pupils: Pupils are equal, round, and reactive to light.   Pulmonary:      Effort: Pulmonary effort is normal. No respiratory distress.      Breath sounds: No wheezing.   Abdominal:      General: There is distension.      Palpations: Abdomen is soft.   Musculoskeletal:         General: No swelling or deformity.      Cervical back: Neck supple. No rigidity.      Right lower leg: Edema (1+) present.      Left lower leg: Edema (1+) present.   Skin:     General: Skin is warm and dry.      Capillary Refill: Capillary refill takes less than 2 seconds.      Findings: No bruising or erythema.       LABS  Labs Ordered and Resulted from Time of ED Arrival to Time of ED Departure   COMPREHENSIVE METABOLIC PANEL - Abnormal       Result Value    Sodium 115 (*)     Potassium >10.0 (*)     Carbon Dioxide (CO2) 23      Anion Gap 1 (*)     Urea Nitrogen 7.8      Creatinine 0.66      GFR Estimate >90      Calcium 7.8 (*)     Chloride 91 (*)     Glucose 86      Alkaline Phosphatase 211 (*)     AST        ALT        Protein Total 7.5      Albumin 2.7 (*)     Bilirubin Total 2.0 (*)    CBC WITH PLATELETS AND DIFFERENTIAL - Abnormal    WBC Count 6.0      RBC Count 3.46 (*)     Hemoglobin 14.2      Hematocrit 40.1       (*)     MCH 41.0 (*)     MCHC 35.4      RDW 14.5      Platelet Count 229      % Neutrophils 75      % Lymphocytes 12      % Monocytes 9      % Eosinophils 3      % Basophils 1      % Immature Granulocytes 1      NRBCs per 100 WBC 0      Absolute Neutrophils 4.5      Absolute Lymphocytes 0.7 (*)     Absolute Monocytes 0.6      Absolute Eosinophils 0.2      Absolute Basophils 0.0      Absolute Immature Granulocytes 0.0      Absolute NRBCs 0.0      COMPREHENSIVE METABOLIC PANEL - Abnormal    Sodium 133 (*)     Potassium 3.5      Carbon Dioxide (CO2) 23      Anion Gap 14      Urea Nitrogen 8.4      Creatinine 0.79      GFR Estimate >90      Calcium 9.6      Chloride 96 (*)     Glucose 80      Alkaline Phosphatase 227 (*)      (*)     ALT 12      Protein Total 6.0 (*)     Albumin 2.8 (*)     Bilirubin Total 2.2 (*)    TSH WITH FREE T4 REFLEX - Abnormal    TSH 4.70 (*)    INR - Abnormal    INR 1.54 (*)    BLOOD GAS VENOUS - Abnormal    pH Venous 7.41      pCO2 Venous 45      pO2 Venous 18 (*)     Bicarbonate Venous 29 (*)     Base Excess/Deficit Venous 3.1 (*)     FIO2 21      Oxyhemoglobin Venous 19 (*)     O2 Sat, Venous 19.3 (*)    LACTIC ACID WHOLE BLOOD - Abnormal    Lactic Acid 2.2 (*)    BILIRUBIN DIRECT - Abnormal    Bilirubin Direct 1.25 (*)    LIPASE - Normal    Lipase 29     PARTIAL THROMBOPLASTIN TIME - Normal    aPTT 31     T4 FREE - Normal    Free T4 1.53           RADIOLOGY  CT Chest/Abdomen/Pelvis w Contrast   Final Result   IMPRESSION:      1.  Interval development of diffuse heterogeneous liver enhancement. On the 10/18/2024 PET/CT, no abnormal liver activity was identified. Differentials include rapidly evolving hepatic metastatic disease versus other process, to include hepatitis or    fibrosis versus heterogeneous steatosis. Recommend liver MRI.      2.  Increased mild to moderate ascites.      3.  Minimal left lung groundglass and clustered micronodules, suggesting infectious / inflammatory change.      4.  Tiny left pleural effusion.            I have independently reviewed the above image. See radiology report for detail.      EKG:    NA       PROCEDURES:  N/A      MEDICATIONS GIVEN IN THE EMERGENCY:  Medications   fentaNYL (PF) (SUBLIMAZE) injection 50 mcg (50 mcg Intravenous Not Given 11/11/24 1212)   ondansetron (ZOFRAN) injection 4 mg (4 mg Intravenous Not Given 11/11/24 1213)   iopamidol (ISOVUE-370) solution 90 mL (90 mLs  Intravenous $Given 11/11/24 1347)       NEW PRESCRIPTIONS STARTED AT TODAY'S ER VISIT  Discharge Medication List as of 11/11/2024  3:36 PM           I, Adalid Rothman, am serving as a scribe to document services personally performed by Leslee Hermosillo MD, based on my observations and the provider's statements to me.  I, Leslee Hermosillo MD, attest that Adalid Rothman is acting in a scribe capacity, has observed my performance of the services and has documented them in accordance with my direction.     Leslee Hermosillo MD  Emergency Medicine  [facility] EMERGENCY ROOM  [address] [address]  [phone]  Dept: [phone]           Leslee Hermosillo MD  11/12/24 0957

## 2024-11-11 NOTE — PHARMACY-ADMISSION MEDICATION HISTORY
"Pharmacist Admission Medication History    Admission medication history is complete. The information provided in this note is only as accurate as the sources available at the time of the update.    Information Source(s): Patient and CareEverywhere/SureScripts via in-person    Pertinent Information: Patient reports \"only really taking gabapentin at the moment.\" Has prescriptions for Famotidine 20 mg daily, Pantoprazole 40 mg daily, Metformin  mg daily and Effexor XR 37.5 mg - 3 capsules daily but not taking them.     Changes made to PTA medication list:  Added: gabapentin  Deleted: None  Changed: None    Allergies reviewed with patient and updates made in EHR: yes    Medication History Completed By: Tomi Yi RPH 11/11/2024 2:49 PM    PTA Med List   Medication Sig Last Dose/Taking    gabapentin (NEURONTIN) 300 MG capsule Take 300 mg by mouth at bedtime. 11/10/2024 Bedtime    ondansetron (ZOFRAN ODT) 4 MG ODT tab Take 1 tablet (4 mg) by mouth every 8 hours as needed for nausea. Unknown     Tomi Yi RPH, PharmD, BCPS 11/11/2024 3:12 PM    "

## 2024-11-12 ENCOUNTER — APPOINTMENT (OUTPATIENT)
Dept: PHYSICAL THERAPY | Facility: HOSPITAL | Age: 49
DRG: 435 | End: 2024-11-12
Attending: HOSPITALIST
Payer: COMMERCIAL

## 2024-11-12 ENCOUNTER — APPOINTMENT (OUTPATIENT)
Dept: ULTRASOUND IMAGING | Facility: HOSPITAL | Age: 49
DRG: 435 | End: 2024-11-12
Attending: PHYSICIAN ASSISTANT
Payer: COMMERCIAL

## 2024-11-12 LAB
ABSOLUTE NEUTROPHILS, BODY FLUID: 0 /UL
ALBUMIN BODY FLUID SOURCE: NORMAL
ALBUMIN FLD-MCNC: 0.3 G/DL
ALBUMIN UR-MCNC: NEGATIVE MG/DL
ANION GAP SERPL CALCULATED.3IONS-SCNC: 9 MMOL/L (ref 7–15)
APPEARANCE FLD: CLEAR
APPEARANCE UR: CLEAR
BILIRUB UR QL STRIP: NEGATIVE
BUN SERPL-MCNC: 6.7 MG/DL (ref 6–20)
CALCIUM SERPL-MCNC: 8.6 MG/DL (ref 8.8–10.4)
CELL COUNT BODY FLUID SOURCE: NORMAL
CHLORIDE SERPL-SCNC: 101 MMOL/L (ref 98–107)
COLOR FLD: YELLOW
COLOR UR AUTO: NORMAL
CREAT SERPL-MCNC: 0.78 MG/DL (ref 0.51–0.95)
EGFRCR SERPLBLD CKD-EPI 2021: >90 ML/MIN/1.73M2
GLUCOSE BLDC GLUCOMTR-MCNC: 120 MG/DL (ref 70–99)
GLUCOSE BLDC GLUCOMTR-MCNC: 131 MG/DL (ref 70–99)
GLUCOSE SERPL-MCNC: 122 MG/DL (ref 70–99)
GLUCOSE UR STRIP-MCNC: NEGATIVE MG/DL
HCO3 SERPL-SCNC: 26 MMOL/L (ref 22–29)
HGB UR QL STRIP: NEGATIVE
HOLD SPECIMEN: NORMAL
KETONES UR STRIP-MCNC: NEGATIVE MG/DL
LEUKOCYTE ESTERASE UR QL STRIP: NEGATIVE
LYMPHOCYTES NFR FLD MANUAL: 20 %
MONOS+MACROS NFR FLD MANUAL: 80 %
NEUTS BAND NFR FLD MANUAL: 0 %
NITRATE UR QL: NEGATIVE
PH UR STRIP: 6.5 [PH] (ref 5–7)
POTASSIUM SERPL-SCNC: 3 MMOL/L (ref 3.4–5.3)
PROT FLD-MCNC: 0.4 G/DL
PROTEIN BODY FLUID SOURCE: NORMAL
RBC # FLD: 0 /UL
SODIUM SERPL-SCNC: 136 MMOL/L (ref 135–145)
SP GR UR STRIP: 1.01 (ref 1–1.03)
UROBILINOGEN UR STRIP-MCNC: <2 MG/DL
WBC # FLD AUTO: 29 /UL

## 2024-11-12 PROCEDURE — 82042 OTHER SOURCE ALBUMIN QUAN EA: CPT | Performed by: PHYSICIAN ASSISTANT

## 2024-11-12 PROCEDURE — 84157 ASSAY OF PROTEIN OTHER: CPT | Performed by: PHYSICIAN ASSISTANT

## 2024-11-12 PROCEDURE — 250N000013 HC RX MED GY IP 250 OP 250 PS 637: Performed by: NURSE PRACTITIONER

## 2024-11-12 PROCEDURE — 80048 BASIC METABOLIC PNL TOTAL CA: CPT | Performed by: HOSPITALIST

## 2024-11-12 PROCEDURE — 99233 SBSQ HOSP IP/OBS HIGH 50: CPT | Performed by: HOSPITALIST

## 2024-11-12 PROCEDURE — 49083 ABD PARACENTESIS W/IMAGING: CPT

## 2024-11-12 PROCEDURE — 89051 BODY FLUID CELL COUNT: CPT | Performed by: PHYSICIAN ASSISTANT

## 2024-11-12 PROCEDURE — 82962 GLUCOSE BLOOD TEST: CPT

## 2024-11-12 PROCEDURE — 0W9G3ZZ DRAINAGE OF PERITONEAL CAVITY, PERCUTANEOUS APPROACH: ICD-10-PCS | Performed by: STUDENT IN AN ORGANIZED HEALTH CARE EDUCATION/TRAINING PROGRAM

## 2024-11-12 PROCEDURE — 97530 THERAPEUTIC ACTIVITIES: CPT | Mod: GP

## 2024-11-12 PROCEDURE — 36415 COLL VENOUS BLD VENIPUNCTURE: CPT | Performed by: HOSPITALIST

## 2024-11-12 PROCEDURE — 89050 BODY FLUID CELL COUNT: CPT | Performed by: PHYSICIAN ASSISTANT

## 2024-11-12 PROCEDURE — 250N000013 HC RX MED GY IP 250 OP 250 PS 637: Performed by: HOSPITALIST

## 2024-11-12 PROCEDURE — 250N000011 HC RX IP 250 OP 636: Performed by: HOSPITALIST

## 2024-11-12 PROCEDURE — 88342 IMHCHEM/IMCYTCHM 1ST ANTB: CPT | Mod: TC | Performed by: PHYSICIAN ASSISTANT

## 2024-11-12 PROCEDURE — G0378 HOSPITAL OBSERVATION PER HR: HCPCS

## 2024-11-12 PROCEDURE — 80321 ALCOHOLS BIOMARKERS 1OR 2: CPT | Performed by: PHYSICIAN ASSISTANT

## 2024-11-12 PROCEDURE — 97161 PT EVAL LOW COMPLEX 20 MIN: CPT | Mod: GP

## 2024-11-12 PROCEDURE — 88341 IMHCHEM/IMCYTCHM EA ADD ANTB: CPT | Mod: TC | Performed by: PHYSICIAN ASSISTANT

## 2024-11-12 PROCEDURE — 120N000001 HC R&B MED SURG/OB

## 2024-11-12 PROCEDURE — 96376 TX/PRO/DX INJ SAME DRUG ADON: CPT

## 2024-11-12 PROCEDURE — 258N000003 HC RX IP 258 OP 636: Performed by: HOSPITALIST

## 2024-11-12 PROCEDURE — 81003 URINALYSIS AUTO W/O SCOPE: CPT | Performed by: HOSPITALIST

## 2024-11-12 RX ORDER — DIPHENOXYLATE HYDROCHLORIDE AND ATROPINE SULFATE 2.5; .025 MG/1; MG/1
1 TABLET ORAL 4 TIMES DAILY PRN
Status: DISCONTINUED | OUTPATIENT
Start: 2024-11-12 | End: 2024-11-14 | Stop reason: HOSPADM

## 2024-11-12 RX ORDER — POTASSIUM CHLORIDE 1.5 G/1.58G
40 POWDER, FOR SOLUTION ORAL 2 TIMES DAILY
Status: DISCONTINUED | OUTPATIENT
Start: 2024-11-12 | End: 2024-11-13

## 2024-11-12 RX ORDER — LORAZEPAM 2 MG/ML
1 INJECTION INTRAMUSCULAR
Status: DISCONTINUED | OUTPATIENT
Start: 2024-11-12 | End: 2024-11-13

## 2024-11-12 RX ORDER — LOPERAMIDE HYDROCHLORIDE 2 MG/1
2 CAPSULE ORAL 4 TIMES DAILY PRN
Status: DISCONTINUED | OUTPATIENT
Start: 2024-11-12 | End: 2024-11-14 | Stop reason: HOSPADM

## 2024-11-12 RX ADMIN — THIAMINE HCL TAB 100 MG 100 MG: 100 TAB at 09:07

## 2024-11-12 RX ADMIN — ONDANSETRON 4 MG: 2 INJECTION INTRAMUSCULAR; INTRAVENOUS at 17:08

## 2024-11-12 RX ADMIN — DEXTROSE AND SODIUM CHLORIDE: 5; 900 INJECTION, SOLUTION INTRAVENOUS at 07:02

## 2024-11-12 RX ADMIN — ONDANSETRON 4 MG: 2 INJECTION INTRAMUSCULAR; INTRAVENOUS at 08:29

## 2024-11-12 RX ADMIN — POTASSIUM CHLORIDE 40 MEQ: 1.5 POWDER, FOR SOLUTION ORAL at 13:04

## 2024-11-12 RX ADMIN — LOPERAMIDE HYDROCHLORIDE 2 MG: 2 CAPSULE ORAL at 10:08

## 2024-11-12 RX ADMIN — DIPHENOXYLATE HYDROCHLORIDE AND ATROPINE SULFATE 1 TABLET: .025; 2.5 TABLET ORAL at 17:15

## 2024-11-12 RX ADMIN — ONDANSETRON 4 MG: 2 INJECTION INTRAMUSCULAR; INTRAVENOUS at 11:59

## 2024-11-12 ASSESSMENT — ACTIVITIES OF DAILY LIVING (ADL)
ADLS_ACUITY_SCORE: 0
DEPENDENT_IADLS:: CLEANING;COOKING;LAUNDRY;SHOPPING;MEAL PREPARATION;MONEY MANAGEMENT;TRANSPORTATION
ADLS_ACUITY_SCORE: 0

## 2024-11-12 NOTE — PROGRESS NOTES
[facility]    Medicine Progress Note - Hospitalist Service    Date of Admission:  11/11/2024    Assessment & Plan   49-year-old female with metastatic breast cancer who has been off chemotherapy for the last month due to intolerance, admitted with failure to thrive.    #Failure to thrive  #Severe malnutrition  #Starvation ketosis  s/p IV fluids with D5 NS overnight  RD consulted  PT, OT    #Hypokalemia  Replace with packet KCL     #Malignant ascites  #Liver metastases  #Metastatic breast cancer  s/p paracentesis 11/8, no evidence of infection  Oncology consulted  Last chemo ~1 month ago, on hold due to intolerance  Family does not seem open to discussing palliative care and are wanting goals of care without limits. Patient seems to be indifferent about her care and voices wish to not be on medications, declined any pain medication.  Oncology ordered MRI, suspect patient will decline this     #Tachycardia  Resolved with IVF    #Hyponatremia and hyperkalemia resulted on labs, invalid results due to hemolyzed sample      DVT ppx: PCDs     Observation Goals: -diagnostic tests and consults completed and resulted, -vital signs normal or at patient baseline, -tolerating oral intake to maintain hydration, -returns to baseline functional status, -safe disposition plan has been identified, Nurse to notify provider when observation goals have been met and patient is ready for discharge.  Diet: Regular Diet Adult    Crisostomo Catheter: Not present  Lines: None     Cardiac Monitoring: None  Code Status: Full Code      Clinically Significant Risk Factors Present on Admission        # Hypokalemia: Lowest K = 3 mmol/L in last 2 days, will replace as needed  # Hyperkalemia: Highest K = 11 mmol/L in last 2 days, will monitor as appropriate  # Hyponatremia: Lowest Na = 115 mmol/L in last 2 days, will monitor as appropriate  # Hypochloremia: Lowest Cl = 91 mmol/L in last 2 days, will monitor as appropriate   #  Hypercalcemia: corrected calcium is >10.1, will monitor as appropriate    # Hypoalbuminemia: Lowest albumin = 2.7 g/dL at 11/11/2024 10:36 AM, will monitor as appropriate  # Coagulation Defect: INR = 1.54 (Ref range: 0.85 - 1.15) and/or PTT = 31 Seconds (Ref range: 22 - 38 Seconds), will monitor for bleeding                         Disposition Plan     Medically Ready for Discharge: Anticipated Tomorrow             Sean Johnson, DO  Hospitalist Service  [facility]  Securely message with Topic (more info)  Text page via [facility] Paging/Directory   ______________________________________________________________________    Interval History   Feeling a little better today    Physical Exam   Vital Signs: Temp: 97.5  F (36.4  C) Temp src: Oral BP: 99/60 Pulse: 88   Resp: 22 SpO2: 98 % O2 Device: None (Room air)    Weight: 138 lbs 3.65 oz    General Appearance:  No acute distress, chronically ill-appearing  Respiratory: Clear to auscultation bilaterally  Cardiovascular: Regular rate and rhythm  GI: Normal bowel sounds, abdomen is soft, mildly distended with no rebound  Extremities: No peripheral edema or cyanosis  Neuro: Alert and oriented x 3, normal speech  Psych: Flat affect      Medical Decision Making       50 MINUTES SPENT BY ME on the date of service doing chart review, history, exam, documentation & further activities per the note.      Data

## 2024-11-12 NOTE — PLAN OF CARE
"PRIMARY DIAGNOSIS: GENERALIZED WEAKNESS    OUTPATIENT/OBSERVATION GOALS TO BE MET BEFORE DISCHARGE  1. Orthostatic performed: Yes:                                    2. Tolerating PO medications: Yes    3. Return to near baseline physical activity: Yes    4. Cleared for discharge by consultants (if involved): No    Discharge Planner Nurse   Safe discharge environment identified: No  Barriers to discharge: Yes       Entered by: Jennifer Dwyer RN 11/11/2024 11:39 PM     Please review provider order for any additional goals.   Nurse to notify provider when observation goals have been met and patient is ready for discharge.  Problem: Adult Inpatient Plan of Care  Goal: Optimal Comfort and Wellbeing  Outcome: Progressing   Goal Outcome Evaluation:  Patient called staff to room numerous times. She was up to BR with loose stools, which is \"her normal\"per patient. Also,  she was requesting \"something for anxiety to help me sleep\". Dr. Gardiner was notified and updated re: Critical lab Quantitative Ketone 2.80. Orthostatic Bps done. Still need UA. Patient has been steady and denies pain or nausea. Requested liquid po medication, as has hard time swallowing pills. Given Imodium and Atarax at hs.                      "

## 2024-11-12 NOTE — H&P
[facility]    History and Physical - Hospitalist Service       Date of Admission:  2024  Emilie Urena,  1975, MRN [id number]  PCP: Kalia Morocho    Assessment & Plan      Emilie Urena is a 49 year old female admitted on 2024. She has history of metastatic breast cancer metastatic to bone and liver, off chemotherapy for the last month due to intolerance, symptoms have not improved, now she is here for worsening malaise and weakness.    # Severe malaise and weakness, failure to thrive  -Reports worsening weakness and fatigue over the last month, nausea, vomiting, diarrhea, anorexia, orthostatic dizziness  -CT showing new diffuse heterogeneous liver enhancement concerning for extensive metastatic disease.  Labs showing only mildly elevated alkaline phosphatase and bilirubin not consistent with complete obstruction.  -No acidosis but she smells faintly of ketones, history of very poor oral intake I suspect she is in a starvation state.  I suspect she will feel better if we give her a dextrose infusion (and B1, and monitor BG in case this goes high would give insulin and not stop infusion)  -Scheduled Premeal IV Zofran  -PT and OT evals  -Oncology consultation  -RD consultation  -Check orthostatic vitals, mag, phos, UA    # Metastatic breast cancer  -Mets to bone and concern for new liver mets on imaging today.  Apparently did not have liver mets on PET scan back in October  -Original diagnosis around .  Had been in remission for about 5 years.  Patient of Dr. Pineda with [facility].  -States her last chemotherapy was about 1 month ago  -Consult oncology as above    #Ascites  -likely malignant and/or related to portal HTN from new liver tumor burden  -just had paracentesis  not infected  -monitor    # Hyponatremia  -IV fluid and recheck in the morning    #Tachycardia  -suspect due to dehydration  -consider CT PE run if not feeling better after addressing the  above    #Goals of care  -patient very clear she wishes full code currently  - Kalia is her proxy    # Peripheral neuropathy  -Home gabapentin    Initial set of labs were severely hemolyzed and invalid         DVT Prophylaxis: High risk. SCDs, she is somewhat autoanticoagulated with INR 1.5  Diet: Regular Diet Adult    Crisostomo Catheter: Not present  Lines: None     Cardiac Monitoring: None  Code Status: Full Code.  Discussed and confirmed with patient    Clinically Significant Risk Factors Present on Admission            # Hypercalcemia: corrected calcium is >10.1, will monitor as appropriate    # Hypoalbuminemia: Lowest albumin = 2.7 g/dL at 11/11/2024 10:36 AM, will monitor as appropriate    # Coagulation Defect: INR = 1.54 (Ref range: 0.85 - 1.15) and/or PTT = 31 Seconds (Ref range: 22 - 38 Seconds), will monitor for bleeding                         Disposition Plan      Expected Discharge Date: 11/12/2024                The patient's care was discussed with the Patient.    Montserrat Gardiner MD  Hospitalist Service  [facility]  Securely message with [employer] (more info)  Text page via [employer] Paging/Directory     ______________________________________________________________________    Chief Complaint   Malaise, weakness, nausea, vomiting, diarrhea, anorexia    History is obtained from the patient    History of Present Illness   Emilie Urena is a 49 year old female who is here for aforementioned symptoms.   Patient reports that over the last several weeks, she has had basically failure to thrive.  Her chemotherapy was stopped because of this, however it has not improved despite being off of chemotherapy.  She reports fatigue, weakness, malaise.  She has had very very poor appetite and has been nauseated and vomits 0-3 times daily.  She notes a feeling of bloating and has been having loose stools.  She notes edema of her arms and legs that has become progressively worse.  Meds have not  been helpful to her.  She does not think she has tried an antiemetic.  She notes occasional pains in her abdomen.  Her urine is pink in color, she states this is not new.  Urine output has been greatly reduced, she is currently urinating about 1 time a day.  She feels dizzy when she stands upright.      Past Medical History    Past Medical History:   Diagnosis Date    Breast cancer (H)        Past Surgical History   No past surgical history on file.    Prior to Admission Medications   Prior to Admission Medications   Prescriptions Last Dose Informant Patient Reported? Taking?   gabapentin (NEURONTIN) 300 MG capsule   Yes No   Sig: Take 300 mg by mouth at bedtime.   ondansetron (ZOFRAN ODT) 4 MG ODT tab   No No   Sig: Take 1 tablet (4 mg) by mouth every 8 hours as needed for nausea.      Facility-Administered Medications: None        Social History   I have reviewed this patient's social history and updated it with pertinent information if needed.  Social History     Tobacco Use    Smoking status: Never   Substance Use Topics    Alcohol use: Not Currently    Drug use: Never        Physical Exam   Vital Signs: Temp: 97.6  F (36.4  C) Temp src: Oral BP: 126/81 Pulse: 106     SpO2: 97 %      Weight: 138 lbs 3.65 oz  General: in no apparent distress, non-toxic, and alert female lying in hospital bed oriented x3  HEENT: Head normocephalic atraumatic, oral mucosa moist. Sclerae anicteric  CV: Regular rhythm, normal rate, no murmurs  Resp: No wheezes, no rales or rhonchi, no focal consolidations  GI: Belly soft,  mildly distended with ascites , nontender, bowel sounds present  Skin:  tan chronically ill appearing  Extremities: Trace ankle edema bilaterally  Psych: Normal affect, mood dysthymic  Neuro: Grossly normal    Medical Decision Making                 Data   Imaging results reviewed over the past 24 hrs:   Recent Results (from the past 24 hours)   CT Chest/Abdomen/Pelvis w Contrast    Narrative    EXAM: CT  CHEST/ABDOMEN/PELVIS W CONTRAST  LOCATION: [facility]  DATE: 11/11/2024    INDICATION: Worsening ascites, liver enzyme elevation. Hx breast neoplasm.  COMPARISON: PET/CT 10/18/2024. 9/19/2024 CT [facility].  TECHNIQUE: CT scan of the chest, abdomen, and pelvis was performed following injection of IV contrast. Multiplanar reformats were obtained. Dose reduction techniques were used.   CONTRAST: Isovue 370 90 ml.    FINDINGS:   LUNGS AND PLEURA: Minimal scattered left upper and lower lobe lobe groundglass and clustered micronodules. Mild to moderate left basilar predominant bandlike opacities, suggestive of atelectasis. Similar sized tiny left pleural effusion. Resolution of   prior right pleural effusion.     MEDIASTINUM/AXILLAE: No thoracic adenopathy.    CORONARY ARTERY CALCIFICATION: Motion artifact.    HEPATOBILIARY: Interval development of diffuse heterogeneous liver enhancement and/or masses. Right and left liver patchy and confluent areas of hypoenhancement are present, most pronounced in the right liver. Superimposed small hypervascular areas are   also noted, including 12 mm hypervascular area along the inferior right liver (series 3, image 142). Cholecystectomy.    PANCREAS: Normal.    SPLEEN: Normal.    ADRENAL GLANDS: Normal.    KIDNEYS/BLADDER: Normal.    BOWEL: Borderline small and large bowel wall thickening, likely from ascites and incomplete distention. No obstruction.    LYMPH NODES: No abdominal or pelvic adenopathy.    VASCULATURE: Nonaneurysmal aorta. Patent draining mesenteric and portal veins.    PELVIC ORGANS: Mild to moderate ascites.    MUSCULOSKELETAL: Surgical changes breasts. Subcutaneous body wall edema. Sclerotic bony metastatic disease again noted.      Impression    IMPRESSION:    1.  Interval development of diffuse heterogeneous liver enhancement. On the 10/18/2024 PET/CT, no abnormal liver activity was identified. Differentials include rapidly evolving hepatic  metastatic disease versus other process, to include hepatitis or   fibrosis versus heterogeneous steatosis. Recommend liver MRI.    2.  Increased mild to moderate ascites.    3.  Minimal left lung groundglass and clustered micronodules, suggesting infectious / inflammatory change.    4.  Tiny left pleural effusion.       Recent Results (from the past 12 hours)   Comprehensive metabolic panel    Collection Time: 11/11/24 10:36 AM   Result Value Ref Range    Sodium 115 (LL) 135 - 145 mmol/L    Potassium >10.0 (HH) 3.4 - 5.3 mmol/L    Carbon Dioxide (CO2) 23 22 - 29 mmol/L    Anion Gap 1 (L) 7 - 15 mmol/L    Urea Nitrogen 7.8 6.0 - 20.0 mg/dL    Creatinine 0.66 0.51 - 0.95 mg/dL    GFR Estimate >90 >60 mL/min/1.73m2    Calcium 7.8 (L) 8.8 - 10.4 mg/dL    Chloride 91 (L) 98 - 107 mmol/L    Glucose 86 70 - 99 mg/dL    Alkaline Phosphatase 211 (H) 40 - 150 U/L    AST      ALT      Protein Total 7.5 6.4 - 8.3 g/dL    Albumin 2.7 (L) 3.5 - 5.2 g/dL    Bilirubin Total 2.0 (H) <=1.2 mg/dL   CBC with platelets and differential    Collection Time: 11/11/24 10:36 AM   Result Value Ref Range    WBC Count 6.0 4.0 - 11.0 10e3/uL    RBC Count 3.46 (L) 3.80 - 5.20 10e6/uL    Hemoglobin 14.2 11.7 - 15.7 g/dL    Hematocrit 40.1 35.0 - 47.0 %     (H) 78 - 100 fL    MCH 41.0 (H) 26.5 - 33.0 pg    MCHC 35.4 31.5 - 36.5 g/dL    RDW 14.5 10.0 - 15.0 %    Platelet Count 229 150 - 450 10e3/uL    % Neutrophils 75 %    % Lymphocytes 12 %    % Monocytes 9 %    % Eosinophils 3 %    % Basophils 1 %    % Immature Granulocytes 1 %    NRBCs per 100 WBC 0 <1 /100    Absolute Neutrophils 4.5 1.6 - 8.3 10e3/uL    Absolute Lymphocytes 0.7 (L) 0.8 - 5.3 10e3/uL    Absolute Monocytes 0.6 0.0 - 1.3 10e3/uL    Absolute Eosinophils 0.2 0.0 - 0.7 10e3/uL    Absolute Basophils 0.0 0.0 - 0.2 10e3/uL    Absolute Immature Granulocytes 0.0 <=0.4 10e3/uL    Absolute NRBCs 0.0 10e3/uL   Extra Red Top Tube    Collection Time: 11/11/24 10:36 AM   Result Value  Ref Range    Hold Specimen [facility]    Comprehensive metabolic panel    Collection Time: 11/11/24 12:40 PM   Result Value Ref Range    Sodium 133 (L) 135 - 145 mmol/L    Potassium 3.5 3.4 - 5.3 mmol/L    Carbon Dioxide (CO2) 23 22 - 29 mmol/L    Anion Gap 14 7 - 15 mmol/L    Urea Nitrogen 8.4 6.0 - 20.0 mg/dL    Creatinine 0.79 0.51 - 0.95 mg/dL    GFR Estimate >90 >60 mL/min/1.73m2    Calcium 9.6 8.8 - 10.4 mg/dL    Chloride 96 (L) 98 - 107 mmol/L    Glucose 80 70 - 99 mg/dL    Alkaline Phosphatase 227 (H) 40 - 150 U/L     (H) 0 - 45 U/L    ALT 12 0 - 50 U/L    Protein Total 6.0 (L) 6.4 - 8.3 g/dL    Albumin 2.8 (L) 3.5 - 5.2 g/dL    Bilirubin Total 2.2 (H) <=1.2 mg/dL   Lipase    Collection Time: 11/11/24 12:40 PM   Result Value Ref Range    Lipase 29 13 - 60 U/L   TSH with free T4 reflex    Collection Time: 11/11/24 12:40 PM   Result Value Ref Range    TSH 4.70 (H) 0.30 - 4.20 uIU/mL   INR    Collection Time: 11/11/24 12:40 PM   Result Value Ref Range    INR 1.54 (H) 0.85 - 1.15   PTT    Collection Time: 11/11/24 12:40 PM   Result Value Ref Range    aPTT 31 22 - 38 Seconds   Blood gas venous    Collection Time: 11/11/24 12:40 PM   Result Value Ref Range    pH Venous 7.41 7.32 - 7.43    pCO2 Venous 45 40 - 50 mm Hg    pO2 Venous 18 (L) 25 - 47 mm Hg    Bicarbonate Venous 29 (H) 21 - 28 mmol/L    Base Excess/Deficit Venous 3.1 (H) -3.0 - 3.0 mmol/L    FIO2 21     Oxyhemoglobin Venous 19 (L) 70 - 75 %    O2 Sat, Venous 19.3 (L) 70.0 - 75.0 %   Lactic acid whole blood    Collection Time: 11/11/24 12:40 PM   Result Value Ref Range    Lactic Acid 2.2 (H) 0.7 - 2.0 mmol/L   Bilirubin direct    Collection Time: 11/11/24 12:40 PM   Result Value Ref Range    Bilirubin Direct 1.25 (H) 0.00 - 0.30 mg/dL   T4 free    Collection Time: 11/11/24 12:40 PM   Result Value Ref Range    Free T4 1.53 0.90 - 1.70 ng/dL

## 2024-11-12 NOTE — PROGRESS NOTES
PRIMARY DIAGNOSIS: GENERALIZED WEAKNESS    OUTPATIENT/OBSERVATION GOALS TO BE MET BEFORE DISCHARGE  1. Orthostatic performed: Yes:                                    2. Tolerating PO medications: Yes    3. Return to near baseline physical activity: Yes    4. Cleared for discharge by consultants (if involved): No    Discharge Planner Nurse   Safe discharge environment identified: No  Barriers to discharge: Yes       Entered by: Angel Garcia RN 11/12/2024 5:04 AM     Please review provider order for any additional goals.   Nurse to notify provider when observation goals have been met and patient is ready for discharge.    Care plan review with pt, pt overall improving.    Pt is Aox4. Pt denied pain, nausea/vomiting. Pt has edema bilateral lower extremities. Pt also has abdomen distention. D5NS infusing at 75ml/hr. No acute changes overnight.    Angel Garcia RN

## 2024-11-12 NOTE — CONSULTS
Consultation    Emilie Urena MRN# [id number]   YOB: 1975 Age: 49 year old   Date of Admission: 11/11/2024  Requesting physician:   Reason for consult:            Assessment:   49 year old female presenting with abdominal pain with  Metastatic breast cancer, BRCA WT, ER+/OR-/HER2-, PIK3CA mut, currently on 2nd line alpelisib + fulvestrant on USOR 60588  Failure to thrive  Abd CT 11/11/24 showing interval development of diffuse heterogeneous liver enhancement, not seen on PET/CT 10/18/24.  Ongoing N/V/D, etiology unclear, ?alpelisib  History of ETOH abuse    Plan:  Recommend liver MRI.  However, patient states she will need sedation and there is some concern her symptoms worsened after recent sedation for EGD/colonoscopy.  Will hold off for now, await GI recs.  Consult to GI to assist with further recommendations on next steps for workup and to help address medical management of recurrent ascites.  If patient undergoes another paracentesis, would recommend we send fluid for cytology.  I do not see that cytology was sent on specimen from 11/8.  Will add lomotil PRN for diarrhea.  Discontinue alpelisib as patient is no longer tolerating.  We will need to change treatment as an outpatient.    Case reviewed with Dr. Pineda.  We will follow along.    CAMMIE Jin  [facility]  [phone] (office)  [phone] (cell)             Chief Complaint:   No chief complaint on file.           History of Present Illness:   Emilie Urena is a 49 year old female with a history of metastatic breast cancer with bone and liver metastases, followed by Dr. Pineda, most recently on 2nd line treatment with alpelisib + fulvestrant on clinical trial.  Alpelisib was held in September for about a month secondary to N/V/D. She resumed treatment on 10/16/24 as she was feeling better. She presents now from  ED.  Per chart review, patient seen at [facility] ED on 11/8/24 presenting with abdominal pain.  On exam, patient's abdomen tender and somewhat firm consistent with ascites. Patient underwent paracentesis and had 2 liters of fluid removed. WBC normal, low suspicion for SBP. Patient offered admission but declined, discharged with prescription for Zofran.     The patient returned to the ED 11/11 with recurrence of abdominal pain. After undergoing paracentesis, her abdomen was less distended, but the fluid started to accumulate again starting the next day. She estimates she is up about 15 lbs from her baseline weight. She endorses ongoing nausea, diarrhea, and decreased appetite for the past month. She has been using imodium for her diarrhea, about 6 tablets per day. The day prior to presentation she had 4 episodes of diarrhea. She has not had any fevers. Lab work shows total bili 2.2, , ALT 12, albumin 2.8, Alk phos 227. Sodium 133.   Bili was normal at 0.8 on 11/8.     The patient recently underwent an EGD and colonoscopy on 11/7/24.  Colonoscopy showed diverticulosis, otherwise negative.  EGD showed possible gastritis with diffuse erythema throughout the entire stomach.  Biopsies were negative for H.Pylori, showed findings consistent with microscopic colitis, possibly secondary to medication effect.    Emilie reports feeling better today.  She continues with diarrhea, last episode about 5 minutes ago.   and neighbor are at bedside.   is frustrated that he just learned last night in a discussion with Dr. Pineda that patient has had liver metastases for the past year and a half. We discussed the need for liver MRI and possible biopsy. Patient states she will not undergo an MRI unless sedated.   questions the need for MRI if we suspect cirrhosis, how will this change the management.  He is angry, feels there has been lack of communication and follow-up with [facility].  Patient missed her last appointment due to not feeling well. He also feels her symptoms worsened after  "sedation during the EGD/Colonoscopy and wonders if further sedation will make things worse.         Physical Exam:   Vitals were reviewed  Blood pressure 99/60, pulse 88, temperature 97.5  F (36.4  C), temperature source Oral, resp. rate 22, height 1.6 m (5' 3\"), weight 62.7 kg (138 lb 3.7 oz), SpO2 98%.  Temperatures:  Current - Temp: 97.5  F (36.4  C); Max - Temp  Av.8  F (36.6  C)  Min: 97.5  F (36.4  C)  Max: 98.1  F (36.7  C)  Respiration range: Resp  Av  Min: 14  Max: 22  Pulse range: Pulse  Av.7  Min: 88  Max: 130  Blood pressure range: Systolic (24hrs), Av , Min:96 , Max:127   ; Diastolic (24hrs), Av, Min:57, Max:85    Pulse oximetry range: SpO2  Av.3 %  Min: 96 %  Max: 100 %    Intake/Output Summary (Last 24 hours) at 2024 0907  Last data filed at 2024 0704  Gross per 24 hour   Intake 937 ml   Output 400 ml   Net 537 ml       GENERAL: Alert and oriented x3, in no acute distress.  HEENT:  EOMI. Sclerae are anicteric.   LUNGS: Clear to auscultation without adventitious lung sounds on anterior exam.  CARDIAC: Normal S1 and normal S2.   ABDOMEN: soft, distended. Bowel sounds present.  EXTREMITIES: nonpitting BLE edema  SKIN: Skin is intact without rash, erythema, petechiae, or ecchymosis.                Past Medical History:   I have reviewed this patient's past medical history  Past Medical History:   Diagnosis Date    Breast cancer (H)              Past Surgical History:   I have reviewed this patient's past surgical history  No past surgical history on file.            Social History:   I have reviewed this patient's social history  Social History     Tobacco Use    Smoking status: Never    Smokeless tobacco: Not on file   Substance Use Topics    Alcohol use: Not Currently             Family History:   I have reviewed this patient's family history  No family history on file.          Allergies:     Allergies   Allergen Reactions    Azithromycin Hives     Hives to " bilateral arms     Hives to bilateral arms    Bee Venom Swelling     Severe swelling, locally.    Sertraline Nausea and Vomiting     Severe nausea    Shellfish-Derived Products Anaphylaxis    Adhesive Tape Rash     Skin Glue at port site placement.             Medications:   I have reviewed this patient's current medications  Medications Prior to Admission   Medication Sig Dispense Refill Last Dose/Taking    gabapentin (NEURONTIN) 300 MG capsule Take 300 mg by mouth at bedtime.       [] ondansetron (ZOFRAN ODT) 4 MG ODT tab Take 1 tablet (4 mg) by mouth every 8 hours as needed for nausea. 10 tablet 0      Current Facility-Administered Medications   Medication Dose Route Frequency Provider Last Rate Last Admin    acetaminophen (TYLENOL) tablet 650 mg  650 mg Oral Q8H PRN Montserrat Gardiner MD        Or    acetaminophen (TYLENOL) Suppository 650 mg  650 mg Rectal Q8H PRN Montserrat Gardiner MD        bisacodyl (DULCOLAX) suppository 10 mg  10 mg Rectal Daily PRN Montserrat Gardiner MD        dextrose 5% and 0.9% NaCl infusion   Intravenous Continuous Montserrat Gardiner MD 75 mL/hr at 24 0702 New Bag at 24 07    glucose gel 15-30 g  15-30 g Oral Q15 Min PRN Montserrat Gardiner MD        Or    dextrose 50 % injection 25-50 mL  25-50 mL Intravenous Q15 Min PRN Montserrat Gardiner MD        Or    glucagon injection 1 mg  1 mg Subcutaneous Q15 Min PRN Montserrat Gardiner MD        gabapentin (NEURONTIN) capsule 300 mg  300 mg Oral At Bedtime Montserrat Gardiner MD        hydrOXYzine (ATARAX) syrup 25 mg  25 mg Oral Q4H PRN Montserrat Gardiner MD   25 mg at 24    insulin aspart (NovoLOG) injection (RAPID ACTING)  1-3 Units Subcutaneous TID AC Montserrat Gardiner MD        loperamide (IMODIUM) liquid 2 mg  2 mg Oral 4x Daily PRN Montserrat Gardiner MD   2 mg at 24    melatonin tablet 5 mg  5 mg Oral At Bedtime PRN Montserrat Gardiner MD        ondansetron (ZOFRAN) injection 4 mg  4 mg Intravenous Q6H PRN Montserrat Gardiner MD   4 mg at  11/11/24 1620    ondansetron (ZOFRAN) injection 4 mg  4 mg Intravenous TID AC Montserrat Gardiner MD   4 mg at 11/12/24 0829    polyethylene glycol (MIRALAX) Packet 17 g  17 g Oral BID PRN Montserrat Gardiner MD        prochlorperazine (COMPAZINE) injection 10 mg  10 mg Intravenous Q6H PRN Montserrat Gardiner MD        Or    prochlorperazine (COMPAZINE) tablet 10 mg  10 mg Oral Q6H PRN Montserrat Gardiner MD        prochlorperazine (COMPAZINE) injection 5 mg  5 mg Intravenous Q6H PRN Montserrat Gardiner MD        promethazine (PHENERGAN) 6.25 mg in sodium chloride 0.9 % 55 mL intermittent infusion  6.25 mg Intravenous Q6H PRN Montserrat Gardiner MD        senna-docusate (SENOKOT-S/PERICOLACE) 8.6-50 MG per tablet 1 tablet  1 tablet Oral BID PRN Montserrat Gardiner MD        Or    senna-docusate (SENOKOT-S/PERICOLACE) 8.6-50 MG per tablet 2 tablet  2 tablet Oral BID PRN Montserrat Gardiner MD        thiamine (B-1) tablet 100 mg  100 mg Oral Daily Sean Johnson DO                 Review of Systems:     The 10 point Review of Systems is negative other than noted in the HPI.            Data:   Data   Results for orders placed or performed during the hospital encounter of 11/11/24 (from the past 24 hours)   Ketone Beta-Hydroxybutyrate Quantitative   Result Value Ref Range    Ketone (Beta-Hydroxybutyrate) Quantitative 2.80 (HH) <=0.30 mmol/L   Magnesium   Result Value Ref Range    Magnesium 1.9 1.7 - 2.3 mg/dL   Phosphorus   Result Value Ref Range    Phosphorus 2.9 2.5 - 4.5 mg/dL   Glucose by meter   Result Value Ref Range    GLUCOSE BY METER POCT 116 (H) 70 - 99 mg/dL   Basic metabolic panel   Result Value Ref Range    Sodium 136 135 - 145 mmol/L    Potassium 3.0 (L) 3.4 - 5.3 mmol/L    Chloride 101 98 - 107 mmol/L    Carbon Dioxide (CO2) 26 22 - 29 mmol/L    Anion Gap 9 7 - 15 mmol/L    Urea Nitrogen 6.7 6.0 - 20.0 mg/dL    Creatinine 0.78 0.51 - 0.95 mg/dL    GFR Estimate >90 >60 mL/min/1.73m2    Calcium 8.6 (L) 8.8 - 10.4 mg/dL    Glucose 122 (H) 70 - 99  mg/dL   Extra Tube    Narrative    The following orders were created for panel order Extra Tube.  Procedure                               Abnormality         Status                     ---------                               -----------         ------                     Extra Purple Top Tube[[id number]]                            Final result                 Please view results for these tests on the individual orders.   Extra Purple Top Tube   Result Value Ref Range    Hold Specimen JIC    UA Macroscopic with reflex to Microscopic and Culture    Specimen: Urine, Midstream   Result Value Ref Range    Color Urine Light Yellow Colorless, Straw, Light Yellow, Yellow    Appearance Urine Clear Clear    Glucose Urine Negative Negative mg/dL    Bilirubin Urine Negative Negative    Ketones Urine Negative Negative mg/dL    Specific Gravity Urine 1.015 1.001 - 1.030    Blood Urine Negative Negative    pH Urine 6.5 5.0 - 7.0    Protein Albumin Urine Negative Negative mg/dL    Urobilinogen Urine <2.0 <2.0 mg/dL    Nitrite Urine Negative Negative    Leukocyte Esterase Urine Negative Negative    Narrative    Microscopic not indicated   Glucose by meter   Result Value Ref Range    GLUCOSE BY METER POCT 120 (H) 70 - 99 mg/dL        EXAM: CT CHEST/ABDOMEN/PELVIS W CONTRAST  LOCATION: [facility]  DATE: 11/11/2024     INDICATION: Worsening ascites, liver enzyme elevation. Hx breast neoplasm.  COMPARISON: PET/CT 10/18/2024. 9/19/2024 CT [facility].  TECHNIQUE: CT scan of the chest, abdomen, and pelvis was performed following injection of IV contrast. Multiplanar reformats were obtained. Dose reduction techniques were used.   CONTRAST: Isovue 370 90 ml.     FINDINGS:   LUNGS AND PLEURA: Minimal scattered left upper and lower lobe lobe groundglass and clustered micronodules. Mild to moderate left basilar predominant bandlike opacities, suggestive of atelectasis. Similar sized tiny left pleural effusion. Resolution of    prior right pleural effusion.      MEDIASTINUM/AXILLAE: No thoracic adenopathy.     CORONARY ARTERY CALCIFICATION: Motion artifact.     HEPATOBILIARY: Interval development of diffuse heterogeneous liver enhancement and/or masses. Right and left liver patchy and confluent areas of hypoenhancement are present, most pronounced in the right liver. Superimposed small hypervascular areas are   also noted, including 12 mm hypervascular area along the inferior right liver (series 3, image 142). Cholecystectomy.     PANCREAS: Normal.     SPLEEN: Normal.     ADRENAL GLANDS: Normal.     KIDNEYS/BLADDER: Normal.     BOWEL: Borderline small and large bowel wall thickening, likely from ascites and incomplete distention. No obstruction.     LYMPH NODES: No abdominal or pelvic adenopathy.     VASCULATURE: Nonaneurysmal aorta. Patent draining mesenteric and portal veins.     PELVIC ORGANS: Mild to moderate ascites.     MUSCULOSKELETAL: Surgical changes breasts. Subcutaneous body wall edema. Sclerotic bony metastatic disease again noted.                                                                      IMPRESSION:     1.  Interval development of diffuse heterogeneous liver enhancement. On the 10/18/2024 PET/CT, no abnormal liver activity was identified. Differentials include rapidly evolving hepatic metastatic disease versus other process, to include hepatitis or   fibrosis versus heterogeneous steatosis. Recommend liver MRI.     2.  Increased mild to moderate ascites.     3.  Minimal left lung groundglass and clustered micronodules, suggesting infectious / inflammatory change.     4.  Tiny left pleural effusion.

## 2024-11-12 NOTE — PLAN OF CARE
"PRIMARY DIAGNOSIS: \"GENERIC\" NURSING  OUTPATIENT/OBSERVATION GOALS TO BE MET BEFORE DISCHARGE:  ADLs back to baseline: Yes    Activity and level of assistance: Up with standby assistance.    Pain status: Pain free.    Return to near baseline physical activity: Yes    Eating % of light meals. Overall flat affect. Transferred to [facility] for Paracentesis.     Discharge Planner Nurse   Safe discharge environment identified: Yes  Barriers to discharge: No       Entered by: Angelina Dubose RN 11/12/2024 2:54 PM     Please review provider order for any additional goals.   Nurse to notify provider when observation goals have been met and patient is ready for discharge.       "

## 2024-11-12 NOTE — PLAN OF CARE
Goal Outcome Evaluation:      Plan of Care Reviewed With: patient          Outcome Evaluation: CM to follow for medical progresson, recommendations, and final d/c plan

## 2024-11-12 NOTE — CONSULTS
[facility] Consultation     Emilie Urena  [address]  [place] [address]  49 year old female     Admission Date/Time: 11/11/2024    HPI:  Emilie Urena is a 49 year old female with a PMH significant for metastatic breast cancer with bone and liver metastases, who presented to the hospital on 11/11 for abdominal pain, found to have likely new liver metastases with recurrent ascites, who we were asked to see for ascites, as well as ongoing nausea, vomiting, and diarrhea.    The patient presented to [facility] emergency department on 11/8, at which time she was found to have a distended abdomen consistent with ascites.  She had a paracentesis at that time, with 2.2 L of clear yellow fluid removed.  Cell count was obtained without evidence of SBP.  Reactive mesothelial cells were present, but no malignant cells identified.  She was offered admission, but declined and returned home.      She re-presented to the hospital on 11/11 for recurrent abdominal pain and recurrent abdominal distension. She reports that after her initial paracentesis her abdominal distension recurred after about 24 hours, but is not as tight as it was prior to the procedure. She reports ongoing nausea, vomiting, and diarrhea, but notes it has been well controlled since being in the hospital. She notes she has been off of her cancer treatment for about one week now, without significant change in her symptoms.     Per the patient and her , she admits to a history of heavy alcohol use. Last use about 2 months ago per the patient.     Prior to admission  The patient was recently seen through our clinic on 10/8/2024 for vomiting and diarrhea.  At that time it was thought that her symptoms were likely secondary to her cancer treatment that she had discontinued 2 weeks prior, at symptoms had resolved at that time.  She underwent EGD and colonoscopy on 11/7.  EGD was notable for possible gastritis, but otherwise normal.   Colonoscopy significant for sigmoid diverticulosis, internal and external hemorrhoids without bleeding, otherwise normal.  Normal duodenal biopsy.  Gastric biopsy showed nonerosive reactive gastropathy.  Mid and distal esophageal biopsies were normal.  Random colon biopsy showed patchy mild surface intraepithelial lymphocytosis suggestive of lymphocytic colitis, although given the patient had symptom resolution after stopping her cancer treatment, steroids were not started at that time.    Her cancer treatment was restarted after being seen in her clinic, after which her symptoms recurred, making the suspicion higher that her medications are causing her symptoms. Per the patient, she has been off of her medication for about one week without significant change in symptoms.  At this time, oncology recommended stopping the treatment as it is not being tolerated.    PAST MEDICAL HISTORY:  Patient Active Problem List    Diagnosis Date Noted    Failure to thrive in adult 11/11/2024     Priority: Medium    Breast carcinoma metastatic to multiple sites (H) 11/11/2024     Priority: Medium    Malignant ascites (H) 11/11/2024     Priority: Medium    Hyponatremia 11/11/2024     Priority: Medium    Peripheral polyneuropathy 11/11/2024     Priority: Medium     SOCIAL HISTORY:  Social History     Tobacco Use    Smoking status: Never   Substance Use Topics    Alcohol use: Not Currently    Drug use: Never     FAMILY HISTORY:  No family history on file.  ALLERGIES:   Allergies   Allergen Reactions    Azithromycin Hives     Hives to bilateral arms     Hives to bilateral arms    Bee Venom Swelling     Severe swelling, locally.    Sertraline Nausea and Vomiting     Severe nausea    Shellfish-Derived Products Anaphylaxis    Adhesive Tape Rash     Skin Glue at port site placement.     MEDICATIONS:   Current Facility-Administered Medications   Medication Dose Route Frequency Provider Last Rate Last Admin    acetaminophen (TYLENOL) tablet  650 mg  650 mg Oral Q8H PRN Montserrat Gardiner MD        Or    acetaminophen (TYLENOL) Suppository 650 mg  650 mg Rectal Q8H PRN Montserrat Gardiner MD        bisacodyl (DULCOLAX) suppository 10 mg  10 mg Rectal Daily PRN Montserrat Gardiner MD        glucose gel 15-30 g  15-30 g Oral Q15 Min PRN Montserrat Gardiner MD        Or    dextrose 50 % injection 25-50 mL  25-50 mL Intravenous Q15 Min PRN Montserrat Gardiner MD        Or    glucagon injection 1 mg  1 mg Subcutaneous Q15 Min PRN Montserrat Gardiner MD        diphenoxylate-atropine (LOMOTIL) 2.5-0.025 MG per tablet 1 tablet  1 tablet Oral 4x Daily PRN Kelsey Jacome APRN CNP        gabapentin (NEURONTIN) capsule 300 mg  300 mg Oral At Bedtime Montserrat Gardiner MD        hydrOXYzine (ATARAX) syrup 25 mg  25 mg Oral Q4H PRN Montserrat Gardiner MD   25 mg at 11/11/24 2137    insulin aspart (NovoLOG) injection (RAPID ACTING)  1-3 Units Subcutaneous TID Montserrat Najear MD        loperamide (IMODIUM) capsule 2 mg  2 mg Oral 4x Daily PRN Kelsey Jacome APRN CNP   2 mg at 11/12/24 1008    melatonin tablet 5 mg  5 mg Oral At Bedtime PRN Montserrat Gardiner MD        ondansetron (ZOFRAN) injection 4 mg  4 mg Intravenous Q6H PRN Montserrat Gardiner MD   4 mg at 11/11/24 1620    ondansetron (ZOFRAN) injection 4 mg  4 mg Intravenous TID Montserrat Najera MD   4 mg at 11/12/24 1159    polyethylene glycol (MIRALAX) Packet 17 g  17 g Oral BID PRN Montserrat Gardiner MD        potassium chloride (KLOR-CON) Packet 40 mEq  40 mEq Oral BID Sean Johnson DO        prochlorperazine (COMPAZINE) injection 10 mg  10 mg Intravenous Q6H PRN Montserrat Gardiner MD        Or    prochlorperazine (COMPAZINE) tablet 10 mg  10 mg Oral Q6H PRN Montserrat Gardiner MD        prochlorperazine (COMPAZINE) injection 5 mg  5 mg Intravenous Q6H PRN Montserrat Gardiner MD        promethazine (PHENERGAN) 6.25 mg in sodium chloride 0.9 % 55 mL intermittent infusion  6.25 mg Intravenous Q6H PRN Montserrat Gardiner MD        senna-docusate  "(SENOKOT-S/PERICOLACE) 8.6-50 MG per tablet 1 tablet  1 tablet Oral BID PRN Montserrat Gardiner MD        Or    senna-docusate (SENOKOT-S/PERICOLACE) 8.6-50 MG per tablet 2 tablet  2 tablet Oral BID PRN Montserrat Gardiner MD        thiamine (B-1) tablet 100 mg  100 mg Oral Daily Sean Johnson, DO   100 mg at 11/12/24 0907     MELD 3.0: 17 at 11/12/2024  7:10 AM  MELD-Na: 15 at 11/12/2024  7:10 AM  Calculated from:  Serum Creatinine: 0.78 mg/dL (Using min of 1 mg/dL) at 11/12/2024  7:10 AM  Serum Sodium: 136 mmol/L at 11/12/2024  7:10 AM  Total Bilirubin: 2.2 mg/dL at 11/11/2024 12:40 PM  Serum Albumin: 2.8 g/dL at 11/11/2024 12:40 PM  INR(ratio): 1.54 at 11/11/2024 12:40 PM  Age at listing (hypothetical): 49 years  Sex: Female at 11/12/2024  7:10 AM    PHYSICAL EXAM:   /75 (BP Location: Right arm)   Pulse 108   Temp 98.3  F (36.8  C) (Oral)   Resp 20   Ht 1.6 m (5' 3\")   Wt 62.7 kg (138 lb 3.7 oz)   SpO2 98%   BMI 24.49 kg/m     GEN: chronically ill appearing, resting comfortably in bed, , parents, and neighbor at bedside  HEENT: No icterus  HRT: appears well perfused  LUNGS: normal respiratory effort  ABD: moderate distension, soft, non-tender  SKIN: Visualized skin intact without rash  MSKL: moving extremities appropriately  NEURO: Alert, answers questions appropriately  PSYCH: flat affect     ADDITIONAL DATA:   I reviewed the patient's new clinical lab test results.   Recent Labs   Lab Test 11/11/24  1240 11/11/24  1036 11/08/24  1110   WBC  --  6.0 7.5   HGB  --  14.2 12.8   MCV  --  116* 116*   PLT  --  229 208   INR 1.54*  --   --      Recent Labs   Lab Test 11/12/24  0710 11/11/24  1240 11/11/24  1036   POTASSIUM 3.0* 3.5 >10.0*   CHLORIDE 101 96* 91*   CO2 26 23 23   BUN 6.7 8.4 7.8   ANIONGAP 9 14 1*     Recent Labs   Lab Test 11/12/24  0711 11/11/24  1240 11/11/24  1036 11/08/24  1110   ALBUMIN  --  2.8* 2.7* 2.6*   BILITOTAL  --  2.2* 2.0* 1.9*   ALT  --  12  --  9   AST  --  103*  --  79* "   PROTEIN Negative  --   --   --    LIPASE  --  29  --  19        Imaging results:  CT chest abdomen pelvis with contrast 11/11/2024  FINDINGS:   LUNGS AND PLEURA: Minimal scattered left upper and lower lobe lobe groundglass and clustered micronodules. Mild to moderate left basilar predominant bandlike opacities, suggestive of atelectasis. Similar sized tiny left pleural effusion. Resolution of   prior right pleural effusion.      MEDIASTINUM/AXILLAE: No thoracic adenopathy.     CORONARY ARTERY CALCIFICATION: Motion artifact.     HEPATOBILIARY: Interval development of diffuse heterogeneous liver enhancement and/or masses. Right and left liver patchy and confluent areas of hypoenhancement are present, most pronounced in the right liver. Superimposed small hypervascular areas are   also noted, including 12 mm hypervascular area along the inferior right liver (series 3, image 142). Cholecystectomy.     PANCREAS: Normal.     SPLEEN: Normal.     ADRENAL GLANDS: Normal.     KIDNEYS/BLADDER: Normal.     BOWEL: Borderline small and large bowel wall thickening, likely from ascites and incomplete distention. No obstruction.     LYMPH NODES: No abdominal or pelvic adenopathy.     VASCULATURE: Nonaneurysmal aorta. Patent draining mesenteric and portal veins.     PELVIC ORGANS: Mild to moderate ascites.     MUSCULOSKELETAL: Surgical changes breasts. Subcutaneous body wall edema. Sclerotic bony metastatic disease again noted.                                                                IMPRESSION:  1.  Interval development of diffuse heterogeneous liver enhancement. On the 10/18/2024 PET/CT, no abnormal liver activity was identified. Differentials include rapidly evolving hepatic metastatic disease versus other process, to include hepatitis or   fibrosis versus heterogeneous steatosis. Recommend liver MRI.  2.  Increased mild to moderate ascites.  3.  Minimal left lung groundglass and clustered micronodules, suggesting  infectious / inflammatory change.  4.  Tiny left pleural effusion.    ASSESSMENT:    Emilie Urena is a 49 year old female with a PMH significant for metastatic breast cancer with bone and liver metastases, who presented to the hospital on 11/11 for abdominal pain, found to have likely new liver metastases with recurrent ascites, who we were asked to see for ascites, as well as ongoing nausea, vomiting, and diarrhea.    Metastatic breast cancer with likely worsening liver metastases now with associated ascites  -CT imaging revealed interval development of diffuse heterogeneous liver enhancement, consistent with rapidly evolving hepatic metastatic disease.  This was associated with increased mild to moderate ascites.  -I discussed this with the provider with oncology, who relayed the patient has a history of frequent alcohol use, and discussed possible concern for underlying cirrhosis possibly contributing to ascites.  We discussed recent PET scan done in October 2024, that was notable for marked diffuse fatty infiltration of the liver, the results of which are not available for myself to review.  Will obtain PETH level to assess for recent alcohol use.  -Recommend getting repeat paracentesis for diagnostic and therapeutic purposes.  Will obtain repeat cell count, as well as albumin level, protein level, and cytology.  Prior paracentesis with cell count not concerning for SBP.  -Agree with liver MRI per oncology for further evaluation of new liver findings.  If they would like to place order now, it should be done with elastography to evaluate for cirrhosis.  If ascitic fluid results not consistent with portal hypertension, no need to pursue elastography with liver MRI.    2.  Nausea/vomiting/heartburn/diarrhea  -The patient has had ongoing symptoms that were suspected to be secondary to ongoing cancer treatment.  She was seen by [facility] recently as noted above, at which time she had been off of treatment for 2 weeks,  and symptoms had resolved.  She was restarted on the medication oncology after the office visit with [facility], after which her symptoms recurred.  EGD and colonoscopy were notable for possible lymphocytic colitis, but she was not started on budesonide based on the clinic visit note stating her symptoms had resolved.    -Currently her symptoms are somewhat well-controlled with Zofran and Imodium.  She is able to tolerate p.o. for the first time in a while.  Oncology recommended switching her to Lomotil as needed for diarrhea.  -At this time would hold off on starting budesonide while we wait and see if the Lomotil works better than the Imodium.  Agree with continuing the Zofran for nausea and vomiting.  -We could consider something like fiber supplementation to add bulk to the stools in the future if Lomotil does not provide adequate relief  -Additionally reports ongoing reflux/frequent belching. Will start IV protonix     3. History of alcohol abuse:   -Several year history of heavy alcohol use per the patient and her . Last use ~2 months ago. Question of fibrosis secondary to this, but this likely at least contributed to hepatic steatosis seen on recent PET scan.     PLAN:  -Paracentesis today  -Liver MRI as discussed with oncology  -If they would like to place order now, it should be done with elastography to evaluate for cirrhosis.  If ascitic fluid results not consistent with portal hypertension, no need to pursue elastography with liver MRI.  -Continue Zofran TID before meals  -Agree with Lomotil prn for diarrhea per oncology  -Start IV protonix 40 mg daily  -Complete abstinence from alcohol is pertinent  - (Nathan Urena) would like updates when he is not present ([phone])    Emy Richter PA-C  [facility]  11/12/2024 12:22 PM  [phone] (office)    This case was discussed with Dr. Khan who agrees to the above assessment and plan.    60 minutes of total time was spent today  providing patient care, including patient evaluation, reviewing documentation/test result, and .   ________________________________________________________________________

## 2024-11-12 NOTE — CONSULTS
"CLINICAL NUTRITION SERVICES - ASSESSMENT NOTE     Nutrition Prescription    RECOMMENDATIONS FOR MDs/PROVIDERS TO ORDER:  Multivitamin with minerals, if appropriate    Malnutrition Status:    Severe in context of acute on chronic illness    Recommendations already ordered by Registered Dietitian (RD):  BMP, mg, phos tomorrow   Gel Plus cherry at 2 PM    Future/Additional Recommendations:  Will monitor po intake, labs for refeeding syndrome, any change in GOC     REASON FOR ASSESSMENT  Emilie Urena is a/an 49 year old female assessed by the dietitian for Provider Order - Malnutrition, supplement selection.    Per chart review, pt admitted on 11/11/2024. She has history of metastatic breast cancer metastatic to bone and liver, off chemotherapy for the last month due to intolerance, symptoms have not improved, now she is here for worsening malaise and weakness.  Ascites, paracentesis 11/8, not infected.     NUTRITION HISTORY  Per H&P 11/11/24,  pt \"reports worsening weakness and fatigue over the last month, nausea, vomiting, diarrhea, anorexia, orthostatic dizziness.  No acidosis but she smells faintly of ketones, history of very poor oral intake I suspect she is in a starvation state.  I suspect she will feel better if we give her a dextrose infusion (and B1, and monitor BG in case this goes high would give insulin and not stop infusion)\"    CURRENT NUTRITION ORDERS  Diet: Regular  Intake/Tolerance: 100%, breakfast 479 calories, 24 gm protein. Nothing ordered yesterday.  Pt's  said she didn't eat 100% but, both he and pt's mother she did eat.  \"Pt hasn't been eating for months\"  Offered a variety of supplements -  would like to try the Gel Plus cherry    LABS  Labs reviewed  K 3.0 L  Glucose 122 H  FSBG 120  Mg, phos yesterday wdl    MEDICATIONS  Medications reviewed  Novolog  Zofran  Kcl 40 mEq 2 times daily  thiamine    ANTHROPOMETRICS  Height: 160 cm (5' 3\")  Most Recent Weight: 62.7 kg (138 lb 3.7 " oz)  2.8% loss in < 1 week, may be fluid  IBW: 52.2 kg  UBW: 127 lb  BMI: Normal BMI  Weight History:   Wt Readings from Last 10 Encounters:   11/11/24 62.7 kg (138 lb 3.7 oz)   11/11/24 64.4 kg (142 lb)   11/08/24 64.4 kg (142 lb)   Unsure if 11/08 is before or after 2 liters removed with paracentesis  Not finding weight hx     Dosing Weight: 62.7 kg    ASSESSED NUTRITION NEEDS  Estimated Energy Needs: 1880 - 2195 kcals/day (30 - 35 kcals/kg )  Justification: Increased needs  Estimated Protein Needs: 63 -75 grams protein/day (1 - 1.2 grams of pro/kg)  Justification: Increased needs  Estimated Fluid Needs: 1570 mL/day (25 mL/kg), or per Provider   Justification: ascites    PHYSICAL FINDINGS  Jaundice  Per chart,  +2 edema bilateral lower extremities  Gi: distended abdomen, hyperactive BS    MALNUTRITION:  % Weight Loss:  > 2% in 1 week (severe malnutrition), may be fluid  % Intake:  </= 50% for >/= 5 days (severe malnutrition)  Subcutaneous Fat Loss:  unable to observe, pt in blanket, sleeping  Muscle Loss:   unable to observe, pt in blanket, sleeping  Fluid Retention:  Mild     Malnutrition Diagnosis: Severe malnutrition  In Context of:  Acute illness or injury on chronic    NUTRITION DIAGNOSIS  Malnutrition related to poor appetite, altered gi function as evidenced by n/v/d, eating <=50% >= 5 days, > 2% weight loss one week, fluid accumulation      INTERVENTIONS  Implementation  Medical food supplement therapy   Ordered labs for tomorrow to check K, Mg, Phos for possible need for replacement  Recommend multivitamin, if appropriate    Goals  Tolerate diet as able  Honor pt food preferences as able  K, Mg, Phos wdl    Monitoring/Evaluation  Progress toward goals will be monitored and evaluated per protocol.

## 2024-11-12 NOTE — PLAN OF CARE
"PRIMARY DIAGNOSIS: \"GENERIC\" NURSING  OUTPATIENT/OBSERVATION GOALS TO BE MET BEFORE DISCHARGE:  ADLs back to baseline: Yes    Activity and level of assistance: Up with standby assistance.    Pain status: Pain free.    Return to near baseline physical activity: Yes    Patient oriented x4, drowsy. Abdomen distended, bowl sounds hyperactive. Jaundice with mild peripenial edema. Patient reports loose stools, requests PRN imodium, only 2nd charted stool in last 12 hours.     Discharge Planner Nurse   Safe discharge environment identified: Yes  Barriers to discharge: No       Entered by: Angelina Dubose RN 11/12/2024 10:15 AM     Please review provider order for any additional goals.   Nurse to notify provider when observation goals have been met and patient is ready for discharge.      "

## 2024-11-12 NOTE — CONSULTS
Care Management Initial Consult    General Information  Assessment completed with: Family, Kalia,   Type of CM/SW Visit: Initial Assessment    Primary Care Provider verified and updated as needed: Yes   Readmission within the last 30 days: no previous admission in last 30 days      Reason for Consult: discharge planning  Advance Care Planning: Advance Care Planning Reviewed: other (see comments) ( says they have a HCD and he will bring in a copy)          Communication Assessment  Patient's communication style: spoken language (English or Bilingual)    Hearing Difficulty or Deaf: no   Wear Glasses or Blind: yes    Cognitive  Cognitive/Neuro/Behavioral: WDL                      Living Environment:   People in home: spouse, child(catalina), dependent  Kalia  Current living Arrangements: house      Able to return to prior arrangements: yes       Family/Social Support:  Care provided by: self, spouse/significant other  Provides care for: no one  Marital Status:   Support system:   Kalia       Description of Support System: Supportive, Involved         Current Resources:   Patient receiving home care services: No        Community Resources: None  Equipment currently used at home: none  Supplies currently used at home: None    Employment/Financial:  Employment Status: disabled        Financial Concerns: none   Referral to Financial Worker: No       Does the patient's insurance plan have a 3 day qualifying hospital stay waiver?  No    Lifestyle & Psychosocial Needs:  Social Drivers of Health     Food Insecurity: Low Risk  (11/11/2024)    Food Insecurity     Within the past 12 months, did you worry that your food would run out before you got money to buy more?: No     Within the past 12 months, did the food you bought just not last and you didn t have money to get more?: No   Depression: At risk (10/27/2023)    Received from [facility], [facility], and  [facility]    Depression     PHQ-2 Score: Not on file     PHQ-9 Score: 5     PHQ-9M Score: Not on file   Housing Stability: Low Risk  (11/11/2024)    Housing Stability     Do you have housing? : Yes     Are you worried about losing your housing?: No   Tobacco Use: Unknown (11/11/2024)    Patient History     Smoking Tobacco Use: Never     Smokeless Tobacco Use: Unknown     Passive Exposure: Not on file   Recent Concern: Tobacco Use - Medium Risk (9/10/2024)    Received from [facility]    Patient History     Smoking Tobacco Use: Former     Smokeless Tobacco Use: Never     Passive Exposure: Not on file   Financial Resource Strain: Low Risk  (11/11/2024)    Financial Resource Strain     Within the past 12 months, have you or your family members you live with been unable to get utilities (heat, electricity) when it was really needed?: No   Alcohol Use: Alcohol Misuse (5/19/2021)    Received from [facility], and [facility], [facility], and [facility]    Alcohol Use     Q1: How often do you have a drink containing alcohol?: 2-3 times a week     Q2: How many drinks containing alcohol do you have on a typical day when you are drinking?: 1 or 2     Q3: How often do you have six or more drinks on one occasion?: Monthly   Transportation Needs: Low Risk  (11/11/2024)    Transportation Needs     Within the past 12 months, has lack of transportation kept you from medical appointments, getting your medicines, non-medical meetings or appointments, work, or from getting things that you need?: No   Physical Activity: Insufficiently Active (5/19/2021)    Received from [facility], and [facility], [facility], and [facility]    Exercise Vital Sign     Days of Exercise per Week: 1 day     Minutes of Exercise per Session: 20 min   Interpersonal Safety: Not At Risk (5/19/2021)    Received from [facility], and [facility], [facility], and [facility]     Humiliation, Afraid, Rape, and Kick questionnaire     Fear of Current or Ex-Partner: No     Emotionally Abused: No     Physically Abused: No     Sexually Abused: No   Stress: Stress Concern Present (5/19/2021)    Received from [facility], and [facility], [facility], and [facility] of Occupational Health - Occupational Stress Questionnaire     Feeling of Stress : To some extent   Social Connections: Moderately Integrated (5/19/2021)    Received from [employer], [facility], and [facility]    Social Connection and Isolation Panel [NHANES]     Frequency of Communication with Friends and Family: More than three times a week     Frequency of Social Gatherings with Friends and Family: Three times a week     Attends Mandaeism Services: 1 to 4 times per year     Active Member of Clubs or Organizations: No     Attends Club or Organization Meetings: Never     Marital Status:    Health Literacy: Not on file       Functional Status:  Prior to admission patient needed assistance:   Dependent ADLs:: Independent  Dependent IADLs:: Cleaning, Cooking, Laundry, Shopping, Meal Preparation, Money Management, Transportation       Mental Health Status:          Chemical Dependency Status:                Values/Beliefs:  Spiritual, Cultural Beliefs, Mandaeism Practices, Values that affect care: no               Discussed  Partnership in Safe Discharge Planning  document with patient/family: No    Additional Information:  CM went to pt room to do assessment and go over discharge planning. Pt was in her room with her , Kalia and a friend. CM asked if it was an ok time to discuss or if I should come back later. Pt told CM that we can talk now and if she falls asleep I can ask her  Kalia the questions. In the beginning the pt was answering the questions but then she fell asleep so Kalia took over. The pt lives in a house with her  son and , Kalia. She is independent with ADLs and  is doing the IADLs as of recently the pt has been too weak. When asked about employment was told the pt is on disability.  asked if I can fill out the forms for the pt's Medicare application, and CM told  that we do not do that. He was understanding. CM asked if he needed resources and the  said no. We also discussed if the pt has any MH or CD concerns and pt was sleeping but  did say he has alcohol abuse concerns, CM then tried to wake the pt but she was soundly sleeping so this will need to be addressed when pt can participate. We also discussed the therapy recommendation for home care and pt's  said they would be interested in home care. Will send a referral for RN/PT/SW (SW to help fill out applications and follow up).    Next Steps: medical progression, Home care sent/pending. Follow up with pt about CD resources or needs.    Chely Olson RN

## 2024-11-12 NOTE — PROGRESS NOTES
11/12/24 0900   Appointment Info   Signing Clinician's Name / Credentials (PT) Pushpa Bashir,PT   Quick Adds   Quick Adds Certification   Living Environment   People in Home spouse   Current Living Arrangements house  (4 level house)   Home Accessibility stairs to enter home;stairs within home   Number of Stairs, Main Entrance 1   Stair Railings, Main Entrance none   Number of Stairs, Within Home, Primary eight   Stair Railings, Within Home, Primary   (1 railing)   Self-Care   Equipment Currently Used at Home none   Fall history within last six months no   Activity/Exercise/Self-Care Comment Pt I ADl/IADLs- fatigue and endurence issue needing some A wirht walking, stairs prior to hospital   General Information   Onset of Illness/Injury or Date of Surgery 11/11/24   Referring Physician Dr. Sean Johnson   Patient/Family Therapy Goals Statement (PT) hoping to return home   Pertinent History of Current Problem (include personal factors and/or comorbidities that impact the POC) Emilie Urena is a 49 year old female who is here for aforementioned symptoms.   Patient reports that over the last several weeks, she has had basically failure to thrive.  Her chemotherapy was stopped because of this, however it has not improved despite being off of chemotherapy.  She reports fatigue, weakness, malaise.  She has had very very poor appetite and has been nauseated and vomits 0-3 times daily.  She notes a feeling of bloating and has been having loose stools.  She notes edema of her arms and legs that has become progressively worse.  Meds have not been helpful to her.  She does not think she has tried an antiemetic.  She notes occasional pains in her abdomen.  Her urine is pink in color, she states this is not new.  Urine output has been greatly reduced, she is currently urinating about 1 time a day.  She feels dizzy when she stands upright.   General Observations pt in bed, family in room   Cognition   Affect/Mental Status  (Cognition) WFL   Pain Assessment   Patient Currently in Pain No   Range of Motion (ROM)   ROM Comment BLE WFL   Strength (Manual Muscle Testing)   Strength Comments 3+ to 4-/5 BLE   Bed Mobility   Impairments Contributing to Impaired Bed Mobility decreased strength   Assistive Device (Bed Mobility) bed rails;other (see comments)  (HOB elevated)   Comment, (Bed Mobility) SBA with bed rail   Transfers   Impairments Contributing to Impaired Transfers decreased strength   Comment, (Transfers) sit>stand SBA pt likes holding onto IV pole, pt denies dizziness   Gait/Stairs (Locomotion)   [place] Level (Gait) contact guard   Assistive Device (Gait) other (see comments)  (pushed IV pole)   Distance in Feet (Gait) 12'   Pattern (Gait) step-through   Deviations/Abnormal Patterns (Gait) weight shifting decreased;bernabe decreased;stride length decreased   Balance   Balance Comments SBA pushing IV pole   Sensory Examination   Sensory Perception Comments BLE no deficits noted   Clinical Impression   Criteria for Skilled Therapeutic Intervention Yes, treatment indicated   PT Diagnosis (PT) decreased functional mobility   Influenced by the following impairments decreased strength and endurence   Functional limitations due to impairments bed mob, transfers . gait, stairs   Clinical Presentation (PT Evaluation Complexity) evolving   Clinical Presentation Rationale presents as medically diagnosed   Clinical Decision Making (Complexity) low complexity   Planned Therapy Interventions (PT) bed mobility training;gait training;strengthening;transfer training   Risk & Benefits of therapy have been explained evaluation/treatment results reviewed;patient   PT Total Evaluation Time   PT Sadie Low Complexity Minutes (12803) 14   Therapy Certification   Start of care date 11/12/24   Certification date from 11/12/24   Certification date to 11/19/24   Medical Diagnosis failure to thrive   Physical Therapy Goals   PT Frequency Daily   PT  Predicted Duration/Target Date for Goal Attainment 11/19/24   PT Goals Bed Mobility;Transfers;Gait;Stairs   PT: Bed Mobility Modified independent;Supine to/from sit  (bed flat)   PT: Transfers Modified independent;Sit to/from stand;Bed to/from chair;Assistive device   PT: Gait Modified independent;Assistive device;100 feet   PT: Stairs Minimal assist;8 stairs  (1 rail)   Interventions   Interventions Quick Adds Therapeutic Activity   Therapeutic Activity   Therapeutic Activities: dynamic activities to improve functional performance Minutes (23877) 10   Symptoms Noted During/After Treatment Fatigue   Treatment Detail/Skilled Intervention sat EOB x2min I with sitting balance, toilet transfer mod I, additional gait 12; SBA pushing IV, pt able to stand at sink Tate and wash hands, pt declined further ambulation due to breakfast presents   PT Discharge Planning   PT Plan flat bed mob, transfers and gait trial noAd, SEcane and FWW , stairs with 1 rail   PT Discharge Recommendation (DC Rec) home with assist;home with home care physical therapy   PT Rationale for DC Rec pt SBA for mobility in room, low endurnce pt would benefit furthe PT   PT Brief overview of current status mod I bed mob, SBA/CGA transfes ,SBA/CGA gait in room pushing IV pole   PT Equipment Needed at Discharge other (see comments)  (TBD - possible need for walking AD)   [facility]  OUTPATIENT PHYSICAL THERAPY EVALUATION  PLAN OF TREATMENT FOR OUTPATIENT REHABILITATION  (COMPLETE FOR INITIAL CLAIMS ONLY)  Patient's Last Name, First Name, M.I.  YOB: 1975  Emilie Urena                        Provider's Name  [facility] Medical Record No.  [id number]                             Onset Date:  11/11/24   Start of Care Date:  (P) 11/12/24   Type:     _X_PT   ___OT   ___SLP Medical Diagnosis:  (P) failure to thrive              PT Diagnosis:  decreased functional mobility Visits from  SOC:  1     See note for plan of treatment, functional goals and certification details    I CERTIFY THE NEED FOR THESE SERVICES FURNISHED UNDER        THIS PLAN OF TREATMENT AND WHILE UNDER MY CARE     (Physician co-signature of this document indicates review and certification of the therapy plan).

## 2024-11-12 NOTE — PROGRESS NOTES
PRIMARY DIAGNOSIS: GENERALIZED WEAKNESS    OUTPATIENT/OBSERVATION GOALS TO BE MET BEFORE DISCHARGE  1. Orthostatic performed: Yes:                                    2. Tolerating PO medications: Yes    3. Return to near baseline physical activity: Yes    4. Cleared for discharge by consultants (if involved): No    Discharge Planner Nurse   Safe discharge environment identified: No  Barriers to discharge: Yes       Entered by: Angel Garcia RN 11/12/2024 1:31 AM     Please review provider order for any additional goals.   Nurse to notify provider when observation goals have been met and patient is ready for discharge.

## 2024-11-13 ENCOUNTER — APPOINTMENT (OUTPATIENT)
Dept: OCCUPATIONAL THERAPY | Facility: HOSPITAL | Age: 49
DRG: 435 | End: 2024-11-13
Attending: HOSPITALIST
Payer: COMMERCIAL

## 2024-11-13 ENCOUNTER — APPOINTMENT (OUTPATIENT)
Dept: MRI IMAGING | Facility: HOSPITAL | Age: 49
DRG: 435 | End: 2024-11-13
Attending: HOSPITALIST
Payer: COMMERCIAL

## 2024-11-13 LAB
ANION GAP SERPL CALCULATED.3IONS-SCNC: 9 MMOL/L (ref 7–15)
BUN SERPL-MCNC: 4.7 MG/DL (ref 6–20)
CALCIUM SERPL-MCNC: 8.7 MG/DL (ref 8.8–10.4)
CHLORIDE SERPL-SCNC: 107 MMOL/L (ref 98–107)
CREAT SERPL-MCNC: 0.7 MG/DL (ref 0.51–0.95)
EGFRCR SERPLBLD CKD-EPI 2021: >90 ML/MIN/1.73M2
GLUCOSE SERPL-MCNC: 84 MG/DL (ref 70–99)
HCO3 SERPL-SCNC: 23 MMOL/L (ref 22–29)
MAGNESIUM SERPL-MCNC: 2 MG/DL (ref 1.7–2.3)
PHOSPHATE SERPL-MCNC: 3.1 MG/DL (ref 2.5–4.5)
POTASSIUM SERPL-SCNC: 5 MMOL/L (ref 3.4–5.3)
SODIUM SERPL-SCNC: 139 MMOL/L (ref 135–145)

## 2024-11-13 PROCEDURE — 97165 OT EVAL LOW COMPLEX 30 MIN: CPT | Mod: GO

## 2024-11-13 PROCEDURE — 84100 ASSAY OF PHOSPHORUS: CPT | Performed by: HOSPITALIST

## 2024-11-13 PROCEDURE — 99233 SBSQ HOSP IP/OBS HIGH 50: CPT | Performed by: HOSPITALIST

## 2024-11-13 PROCEDURE — 250N000013 HC RX MED GY IP 250 OP 250 PS 637: Performed by: NURSE PRACTITIONER

## 2024-11-13 PROCEDURE — 250N000011 HC RX IP 250 OP 636: Performed by: HOSPITALIST

## 2024-11-13 PROCEDURE — A9585 GADOBUTROL INJECTION: HCPCS | Performed by: HOSPITALIST

## 2024-11-13 PROCEDURE — 120N000001 HC R&B MED SURG/OB

## 2024-11-13 PROCEDURE — 80048 BASIC METABOLIC PNL TOTAL CA: CPT | Performed by: HOSPITALIST

## 2024-11-13 PROCEDURE — 250N000013 HC RX MED GY IP 250 OP 250 PS 637: Performed by: HOSPITALIST

## 2024-11-13 PROCEDURE — 250N000013 HC RX MED GY IP 250 OP 250 PS 637: Performed by: PHYSICIAN ASSISTANT

## 2024-11-13 PROCEDURE — 84295 ASSAY OF SERUM SODIUM: CPT | Performed by: HOSPITALIST

## 2024-11-13 PROCEDURE — 250N000009 HC RX 250: Performed by: PHYSICIAN ASSISTANT

## 2024-11-13 PROCEDURE — 87493 C DIFF AMPLIFIED PROBE: CPT | Performed by: NURSE PRACTITIONER

## 2024-11-13 PROCEDURE — 255N000002 HC RX 255 OP 636: Performed by: HOSPITALIST

## 2024-11-13 PROCEDURE — 74183 MRI ABD W/O CNTR FLWD CNTR: CPT

## 2024-11-13 PROCEDURE — 82310 ASSAY OF CALCIUM: CPT | Performed by: HOSPITALIST

## 2024-11-13 PROCEDURE — 83735 ASSAY OF MAGNESIUM: CPT | Performed by: HOSPITALIST

## 2024-11-13 PROCEDURE — 36415 COLL VENOUS BLD VENIPUNCTURE: CPT | Performed by: HOSPITALIST

## 2024-11-13 RX ORDER — LORAZEPAM 2 MG/ML
1 INJECTION INTRAMUSCULAR ONCE
Status: COMPLETED | OUTPATIENT
Start: 2024-11-13 | End: 2024-11-13

## 2024-11-13 RX ORDER — LORAZEPAM 2 MG/ML
1 INJECTION INTRAMUSCULAR
Status: DISCONTINUED | OUTPATIENT
Start: 2024-11-13 | End: 2024-11-14 | Stop reason: HOSPADM

## 2024-11-13 RX ORDER — FUROSEMIDE 20 MG/1
10 TABLET ORAL DAILY
Status: DISCONTINUED | OUTPATIENT
Start: 2024-11-13 | End: 2024-11-14 | Stop reason: HOSPADM

## 2024-11-13 RX ORDER — SPIRONOLACTONE 25 MG/1
25 TABLET ORAL DAILY
Status: DISCONTINUED | OUTPATIENT
Start: 2024-11-13 | End: 2024-11-14 | Stop reason: HOSPADM

## 2024-11-13 RX ORDER — DIPHENOXYLATE HYDROCHLORIDE AND ATROPINE SULFATE 2.5; .025 MG/1; MG/1
1 TABLET ORAL 2 TIMES DAILY
Status: DISCONTINUED | OUTPATIENT
Start: 2024-11-13 | End: 2024-11-14 | Stop reason: HOSPADM

## 2024-11-13 RX ORDER — GADOBUTROL 604.72 MG/ML
6 INJECTION INTRAVENOUS ONCE
Status: COMPLETED | OUTPATIENT
Start: 2024-11-13 | End: 2024-11-13

## 2024-11-13 RX ADMIN — DIPHENOXYLATE HYDROCHLORIDE AND ATROPINE SULFATE 1 TABLET: .025; 2.5 TABLET ORAL at 13:17

## 2024-11-13 RX ADMIN — ONDANSETRON 4 MG: 2 INJECTION INTRAMUSCULAR; INTRAVENOUS at 09:04

## 2024-11-13 RX ADMIN — FUROSEMIDE 10 MG: 20 TABLET ORAL at 17:23

## 2024-11-13 RX ADMIN — ONDANSETRON 4 MG: 2 INJECTION INTRAMUSCULAR; INTRAVENOUS at 12:06

## 2024-11-13 RX ADMIN — GABAPENTIN 300 MG: 300 CAPSULE ORAL at 22:13

## 2024-11-13 RX ADMIN — ONDANSETRON 4 MG: 2 INJECTION INTRAMUSCULAR; INTRAVENOUS at 16:51

## 2024-11-13 RX ADMIN — LOPERAMIDE HYDROCHLORIDE 2 MG: 2 CAPSULE ORAL at 10:27

## 2024-11-13 RX ADMIN — SPIRONOLACTONE 25 MG: 25 TABLET, FILM COATED ORAL at 16:50

## 2024-11-13 RX ADMIN — PANTOPRAZOLE SODIUM 40 MG: 40 INJECTION, POWDER, FOR SOLUTION INTRAVENOUS at 09:05

## 2024-11-13 RX ADMIN — THIAMINE HCL TAB 100 MG 100 MG: 100 TAB at 09:04

## 2024-11-13 RX ADMIN — LORAZEPAM 1 MG: 2 INJECTION INTRAMUSCULAR; INTRAVENOUS at 20:12

## 2024-11-13 RX ADMIN — DIPHENOXYLATE HYDROCHLORIDE AND ATROPINE SULFATE 1 TABLET: .025; 2.5 TABLET ORAL at 22:13

## 2024-11-13 RX ADMIN — GADOBUTROL 6 ML: 604.72 INJECTION INTRAVENOUS at 22:01

## 2024-11-13 NOTE — PROGRESS NOTES
"    Progress Note             Assessment and Plan:   49 year old female presenting with abdominal pain with  Metastatic breast cancer, BRCA WT, ER+/FL-/HER2-, PIK3CA mut, currently on 2nd line alpelisib + fulvestrant on USOR 11184  Failure to thrive  Abd CT 11/11/24 showing interval development of diffuse heterogeneous liver enhancement, not seen on PET/CT 10/18/24.  Ongoing N/V/D, etiology unclear, ?alpelisib  History of ETOH abuse  Ascites, s/p paracentesis 11/12     Plan:  Liver MRI this evening. If unable to complete with ativan premed than would hold off for now and avoid full sedation.  Cytology from paracentesis is pending.   Continue lomotil and imodium PRN for diarrhea. Suspect this is from oral chemotherapy but will check c-diff as well.    Discontinue alpelisib as patient is no longer tolerating.  We will need to change treatment as an outpatient.  MRI is pending.  If it shows worsening malignancy, then we would like to consider a biopsy.      Case reviewed with Dr. Pineda.  We will follow along.    Dispo:  We will arrange follow-up in our clinic within one week of discharge.    Kelsey Ayaz, CAMMIE  [facility]  [phone] (office)  [phone] (cell)        Interval History:     Patient reports feeling better today.  She was able to eat some eggs and other foods.  Diarrhea continues, has had about 4 loose stools today.  Emilie states she wants to live and wants to do everything that is recommended.  She states \"I got myself into this mess and now I need to fix it.\"  She is referring to the alcohol. She is hoping to go home soon.              Review of Systems:     The 5 point Review of Systems is negative other than noted in the HPI             Medications:   Scheduled Medications  Current Facility-Administered Medications   Medication Dose Route Frequency Provider Last Rate Last Admin    diphenoxylate-atropine (LOMOTIL) 2.5-0.025 MG per tablet 1 tablet  1 tablet Oral BID Soraya Pabon, " RISA Goldstein        gabapentin (NEURONTIN) capsule 300 mg  300 mg Oral At Bedtime Montserrat Gardiner MD        LORazepam (ATIVAN) injection 1 mg  1 mg Intravenous Once Sean Johnson DO        ondansetron (ZOFRAN) injection 4 mg  4 mg Intravenous TID AC Montserrat Gardiner MD   4 mg at 11/13/24 1206    pantoprazole (PROTONIX) IV push injection 40 mg  40 mg Intravenous Daily with breakfast Emy Richter PA-C   40 mg at 11/13/24 0905    thiamine (B-1) tablet 100 mg  100 mg Oral Daily Sean Johnson DO   100 mg at 11/13/24 0904     PRN Medications  Current Facility-Administered Medications   Medication Dose Route Frequency Provider Last Rate Last Admin    acetaminophen (TYLENOL) tablet 650 mg  650 mg Oral Q8H PRN Montserrat Gardiner MD        Or    acetaminophen (TYLENOL) Suppository 650 mg  650 mg Rectal Q8H PRN Montserrat Gardiner MD        bisacodyl (DULCOLAX) suppository 10 mg  10 mg Rectal Daily PRN Montserrat Gardiner MD        glucose gel 15-30 g  15-30 g Oral Q15 Min PRN Montserrat Gardiner MD        Or    dextrose 50 % injection 25-50 mL  25-50 mL Intravenous Q15 Min PRN Montserrat Gardiner MD        Or    glucagon injection 1 mg  1 mg Subcutaneous Q15 Min PRN Montserrat Gardiner MD        diphenoxylate-atropine (LOMOTIL) 2.5-0.025 MG per tablet 1 tablet  1 tablet Oral 4x Daily PRN Kelsey Jacome APRN CNP   1 tablet at 11/13/24 1317    hydrOXYzine (ATARAX) syrup 25 mg  25 mg Oral Q4H PRN Montserrat Gardiner MD   25 mg at 11/11/24 2137    loperamide (IMODIUM) capsule 2 mg  2 mg Oral 4x Daily PRN Kelsey Jacome APRN CNP   2 mg at 11/13/24 1027    LORazepam (ATIVAN) injection 1 mg  1 mg Intravenous Once PRN Sean Johnson DO        melatonin tablet 5 mg  5 mg Oral At Bedtime PRN Montserrat Gardiner MD        ondansetron (ZOFRAN) injection 4 mg  4 mg Intravenous Q6H PRN Montserrat Gardiner MD   4 mg at 11/11/24 1620    polyethylene glycol (MIRALAX) Packet 17 g  17 g Oral BID PRN Montserrat Gardiner MD        prochlorperazine (COMPAZINE)  "injection 10 mg  10 mg Intravenous Q6H PRN Montserrat Gardiner MD        Or    prochlorperazine (COMPAZINE) tablet 10 mg  10 mg Oral Q6H PRN Montserrat Gardiner MD        prochlorperazine (COMPAZINE) injection 5 mg  5 mg Intravenous Q6H PRN Montserrat Gardiner MD        promethazine (PHENERGAN) 6.25 mg in sodium chloride 0.9 % 55 mL intermittent infusion  6.25 mg Intravenous Q6H PRN Montserrat Gardiner MD        senna-docusate (SENOKOT-S/PERICOLACE) 8.6-50 MG per tablet 1 tablet  1 tablet Oral BID PRN Montserrat Gardiner MD        Or    senna-docusate (SENOKOT-S/PERICOLACE) 8.6-50 MG per tablet 2 tablet  2 tablet Oral BID PRN Montserrat Gardiner MD                      Physical Exam:   Vitals were reviewed  Blood pressure 95/58, pulse 97, temperature 97.9  F (36.6  C), temperature source Oral, resp. rate 18, height 1.6 m (5' 3\"), weight 62.7 kg (138 lb 3.7 oz), SpO2 97%.  Wt Readings from Last 4 Encounters:   11/11/24 62.7 kg (138 lb 3.7 oz)   11/11/24 64.4 kg (142 lb)   11/08/24 64.4 kg (142 lb)       I/O last 3 completed shifts:  In: 120 [P.O.:120]  Out: 675 [Urine:675]    GENERAL: Alert and oriented x3, well-appearing, in no acute distress.  HEENT:  EOMI. Sclerae are anicteric. Mucous membranes are pink, moist, and intact without evidence of thrush or mucositis.  LUNGS: Clear to auscultation without adventitious lung sounds.  CARDIAC: Normal S1 and normal S2. No murmurs or gallops appreciated.  ABDOMEN: soft, nontender, nondistended. Bowel sounds present.  EXTREMITIES: Without edema or cyanosis.  SKIN: Skin is intact without rash, erythema, petechiae, or ecchymosis.             Data:   All laboratory data and imaging studies reviewed.    CMP  Recent Labs   Lab 11/13/24  0831 11/12/24  1207 11/12/24  0821 11/12/24  0710 11/11/24  2043 11/11/24  1848 11/11/24  1240 11/11/24  1036 11/08/24  1110     --   --  136  --   --  133* 115* 134*   POTASSIUM 5.0  --   --  3.0*  --   --  3.5 >10.0* 3.5   CHLORIDE 107  --   --  101  --   --  96* 91* " 97*   CO2 23  --   --  26  --   --  23 23 23   ANIONGAP 9  --   --  9  --   --  14 1* 14   GLC 84 131* 120* 122*   < >  --  80 86 102*   BUN 4.7*  --   --  6.7  --   --  8.4 7.8 4.7*   CR 0.70  --   --  0.78  --   --  0.79 0.66 0.81   GFRESTIMATED >90  --   --  >90  --   --  >90 >90 90   ADELINA 8.7*  --   --  8.6*  --   --  9.6 7.8* 8.8   MAG 2.0  --   --   --   --  1.9  --   --   --    PHOS 3.1  --   --   --   --  2.9  --   --   --    PROTTOTAL  --   --   --   --   --   --  6.0* 7.5 5.6*   ALBUMIN  --   --   --   --   --   --  2.8* 2.7* 2.6*   BILITOTAL  --   --   --   --   --   --  2.2* 2.0* 1.9*   ALKPHOS  --   --   --   --   --   --  227* 211* 199*   AST  --   --   --   --   --   --  103*  --  79*   ALT  --   --   --   --   --   --  12  --  9    < > = values in this interval not displayed.     CBC  Recent Labs   Lab 11/11/24  1036 11/08/24  1110   WBC 6.0 7.5   RBC 3.46* 3.13*   HGB 14.2 12.8   HCT 40.1 36.3   * 116*   MCH 41.0* 40.9*   MCHC 35.4 35.3   RDW 14.5 13.6    208     INR  Recent Labs   Lab 11/11/24  1240   INR 1.54*     Data   Results for orders placed or performed during the hospital encounter of 11/11/24 (from the past 24 hours)   Basic metabolic panel   Result Value Ref Range    Sodium 139 135 - 145 mmol/L    Potassium 5.0 3.4 - 5.3 mmol/L    Chloride 107 98 - 107 mmol/L    Carbon Dioxide (CO2) 23 22 - 29 mmol/L    Anion Gap 9 7 - 15 mmol/L    Urea Nitrogen 4.7 (L) 6.0 - 20.0 mg/dL    Creatinine 0.70 0.51 - 0.95 mg/dL    GFR Estimate >90 >60 mL/min/1.73m2    Calcium 8.7 (L) 8.8 - 10.4 mg/dL    Glucose 84 70 - 99 mg/dL   Magnesium   Result Value Ref Range    Magnesium 2.0 1.7 - 2.3 mg/dL   Phosphorus   Result Value Ref Range    Phosphorus 3.1 2.5 - 4.5 mg/dL

## 2024-11-13 NOTE — PROGRESS NOTES
11/13/24 1035   Appointment Info   Signing Clinician's Name / Credentials (OT) Adele Esteban BRENDON OTR/L CLT   Living Environment   People in Home spouse   Current Living Arrangements house   Home Accessibility stairs to enter home;stairs within home   Number of Stairs, Main Entrance 1   Stair Railings, Main Entrance none   Number of Stairs, Within Home, Primary eight   Transportation Anticipated family or friend will provide   Self-Care   Activity/Exercise/Self-Care Comment Walkin shower, std toilet, I with ADLs   General Information   Onset of Illness/Injury or Date of Surgery 11/11/24   Referring Physician    Additional Occupational Profile Info/Pertinent History of Current Problem Emilie Urena is a 49 year old female admitted on 11/11/2024. She has history of metastatic breast cancer metastatic to bone and liver, off chemotherapy for the last month due to intolerance, symptoms have not improved, now she is here for worsening malaise and weakness.   Cognitive Status Examination   Affect/Mental Status (Cognitive) flat/blunted affect   Follows Commands follows one-step commands   Bed Mobility   Bed Mobility supine-sit;sit-supine   Supine-Sit [place] (Bed Mobility) supervision   Sit-Supine [place] (Bed Mobility) supervision   Comment (Bed Mobility) Tried climbing into bed, increased effort due to limited knee/hip ROM. Instructed on safe technique for bed mobility   Transfers   Transfers sit-stand transfer;toilet transfer   Sit-Stand Transfer   Sit-Stand [place] (Transfers) supervision   Sit/Stand Transfer Comments Ambulated functional distance in hallway with SBA and no device. States she feels much better today   Toilet Transfer   Type (Toilet Transfer) sit-stand;stand-sit   [place] Level (Toilet Transfer) supervision   Activities of Daily Living   BADL Assessment/Intervention lower body dressing   Lower Body Dressing Assessment/Training   [place] Level (Lower Body Dressing)  shoes/slippers;independent   Clinical Impression   Criteria for Skilled Therapeutic Interventions Met (OT) Evaluation only   Clinical Decision Making Complexity (OT) problem focused assessment/low complexity   Risk & Benefits of therapy have been explained evaluation/treatment results reviewed   OT Total Evaluation Time   OT Eval, Low Complexity Minutes (69817) 12   OT Discharge Planning   OT Plan DC OT   OT Discharge Recommendation (DC Rec) home with assist   OT Rationale for DC Rec No further skilled OT during acute stay. Patient able to complete basic ADLs and trsfs. Defer to PT for mobility and tsfs.   Total Session Time   Total Session Time (sum of timed and untimed services) 12

## 2024-11-13 NOTE — PLAN OF CARE
"  Problem: Adult Inpatient Plan of Care  Goal: Plan of Care Review  Description: The Plan of Care Review/Shift note should be completed every shift.  The Outcome Evaluation is a brief statement about your assessment that the patient is improving, declining, or no change.  This information will be displayed automatically on your shift  note.  Outcome: Progressing  Flowsheets (Taken 11/13/2024 8774)  Plan of Care Reviewed With: patient  Goal: Patient-Specific Goal (Individualized)  Description: You can add care plan individualizations to a care plan. Examples of Individualization might be:  \"Parent requests to be called daily at 9am for status\", \"I have a hard time hearing out of my right ear\", or \"Do not touch me to wake me up as it startles  me\".  Outcome: Progressing  Goal: Absence of Hospital-Acquired Illness or Injury  Outcome: Progressing  Goal: Optimal Comfort and Wellbeing  Outcome: Progressing  Goal: Readiness for Transition of Care  Outcome: Progressing     Problem: Liver Failure  Goal: Fluid and Electrolyte Balance  Outcome: Progressing  Goal: Improved Oral Intake  Outcome: Progressing   Goal Outcome Evaluation:      Plan of Care Reviewed With: patient      Patient A and Ox4. Standby assist. VSS on RA. No pain reported this shift. Patient refused evening meds on previous shift.            "

## 2024-11-13 NOTE — PROGRESS NOTES
"PRIMARY DIAGNOSIS: \"GENERIC\" NURSING  OUTPATIENT/OBSERVATION GOALS TO BE MET BEFORE DISCHARGE:  ADLs back to baseline: Yes    Activity and level of assistance: Up with standby assistance.    Pain status: Pain free.    Return to near baseline physical activity: Yes     Discharge Planner Nurse   Safe discharge environment identified: Yes  Barriers to discharge: No       Entered by: Radha Hagen RN 11/12/2024 8:32 PM     Please review provider order for any additional goals.   Nurse to notify provider when observation goals have been met and patient is ready for discharge.    Radha Hagen RN  [phone]  "

## 2024-11-13 NOTE — PROGRESS NOTES
[facility] Progress Note - Hospitalist Service    Date of Admission:  11/11/2024    Assessment & Plan   49-year-old female with metastatic breast cancer who has been off chemotherapy for the last month due to intolerance, admitted with failure to thrive and found to have cirrhosis and recurrent ascites.    #Failure to thrive  #Severe malnutrition  #Starvation ketosis  s/p IV fluids with D5 NS  RD consulted  PT, OT    #Hypokalemia  Replace with packet KCL     #Malignant ascites  #Liver metastases  #Metastatic breast cancer  s/p paracentesis 11/8, no evidence of infection  Oncology consulted  Last chemo ~1 month ago, on hold due to intolerance  Family does not seem open to discussing palliative care and are wanting goals of care without limits. Patient seems to be indifferent about her care and voices wish to not be on medications, declined any pain medication.  s/p repeat paracentesis 11/12 2.45L, cytology pending  GI consulted- recommend MRI liver w/ elastography. Spoke to MRI, our facility does not have the software to perform elastography (only [facility] does). Patient will need ativan prior to MRI. IF patient can not tolerate MRI with ativan, Oncology recommends holding off and avoiding further sedation  GI added low dose lasix and spironolactone    #Tachycardia  Resolved with IVF    #Hyponatremia and hyperkalemia resulted on initial labs, invalid results due to hemolyzed sample      DVT ppx: PCDs          Diet: Regular Diet Adult  Snacks/Supplements Adult: Gelatein Plus; Between Meals    Crisostomo Catheter: Not present  Lines: None     Cardiac Monitoring: None  Code Status: Full Code      Clinically Significant Risk Factors Present on Admission        # Hypokalemia: Lowest K = 3 mmol/L in last 2 days, will replace as needed  # Hyperkalemia: Highest K = 11 mmol/L in last 2 days, will monitor as appropriate  # Hyponatremia: Lowest Na = 115 mmol/L in last 2 days, will monitor as  appropriate  # Hypochloremia: Lowest Cl = 91 mmol/L in last 2 days, will monitor as appropriate   # Hypercalcemia: corrected calcium is >10.1, will monitor as appropriate    # Hypoalbuminemia: Lowest albumin = 2.7 g/dL at 11/11/2024 10:36 AM, will monitor as appropriate  # Coagulation Defect: INR = 1.54 (Ref range: 0.85 - 1.15) and/or PTT = 31 Seconds (Ref range: 22 - 38 Seconds), will monitor for bleeding               # Severe Malnutrition: based on nutrition assessment     # Financial/Environmental Concerns: none         Disposition Plan     Medically Ready for Discharge: Anticipated Tomorrow             Sean Johnson DO  Hospitalist Service  [facility]  Securely message with [employer] (more info)  Text page via [employer] Paging/Directory   ______________________________________________________________________    Interval History   Feeling better today, appetite improved    Physical Exam   Vital Signs: Temp: 98  F (36.7  C) Temp src: Oral BP: 105/71 Pulse: 93   Resp: 18 SpO2: 98 % O2 Device: None (Room air)    Weight: 138 lbs 3.65 oz    General Appearance:  No acute distress, chronically ill appearing  Respiratory: Clear to auscultation bilaterally  Cardiovascular: Regular rate and rhythm  GI: Normal bowel sounds, abdomen is soft with no rebound  Extremities: No peripheral edema or cyanosis  Neuro: Alert and oriented x 3, normal speech      Medical Decision Making       50 MINUTES SPENT BY ME on the date of service doing chart review, history, exam, documentation & further activities per the note.      Data

## 2024-11-13 NOTE — PROGRESS NOTES
"[facility] Progress Note       SUBJECTIVE:  Patient reports improvement in abdominal distension after paracentesis yesterday, LE edema ongoing. Still eating better than she had been previously. Having diarrhea but gets improvement with Imodium or Lomotil, not sure one works better than the other.     Patient seen with her parents, neighbor,  on speaker phone, and a dog.        OBJECTIVE:  BP 95/58 (BP Location: Right arm)   Pulse 97   Temp 97.9  F (36.6  C) (Oral)   Resp 18   Ht 1.6 m (5' 3\")   Wt 62.7 kg (138 lb 3.7 oz)   SpO2 97%   BMI 24.49 kg/m    Temp (24hrs), Av.9  F (36.6  C), Min:97.8  F (36.6  C), Max:98  F (36.7  C)    Patient Vitals for the past 72 hrs:   Weight   24 1607 62.7 kg (138 lb 3.7 oz)       Intake/Output Summary (Last 24 hours) at 2024 1616  Last data filed at 2024 0914  Gross per 24 hour   Intake 120 ml   Output 675 ml   Net -555 ml        PHYSICAL EXAM  GEN: NAD, female appears stated age sitting up in bed  HRT: + LE edema  RESP: unlabored  ABD: distended, semi-soft, nontender  SKIN: No rash or jaundice      Additional Data:  I have reviewed the patient's new clinical lab results:     Recent Labs   Lab Test 24  1240 24  1036 24  1110   WBC  --  6.0 7.5   HGB  --  14.2 12.8   MCV  --  116* 116*   PLT  --  229 208   INR 1.54*  --   --      Recent Labs   Lab Test 24  0831 24  0710 24  1240   POTASSIUM 5.0 3.0* 3.5   CHLORIDE 107 101 96*   CO2 23 26 23   BUN 4.7* 6.7 8.4   ANIONGAP 9 9 14     Recent Labs   Lab Test 24  0711 24  1240 24  1036 24  1110   ALBUMIN  --  2.8* 2.7* 2.6*   BILITOTAL  --  2.2* 2.0* 1.9*   ALT  --  12  --  9   AST  --  103*  --  79*   PROTEIN Negative  --   --   --    LIPASE  --  29  --  19     C diff: needs to be collected  PEth: pending    Imaging results:  US-guided paracentesis 24:  IMPRESSION:  1.  Status post ultrasound-guided paracentesis with 2.45 L " removed, cytology pending.    Latest Reference Range& Units 11/12/24 15:43   Cell Count Fluid Source  Abdomen   RBC Fluid /uL 0   Total Nucleated Cells /uL 29   Absolute Neutrophils, Body Fluid /uL 0.0   % Neutrophils Fluid % 0   % Lymphocytes Fluid % 20   % Mono/Macro Fluid % 80   Color Fluid Colorless, Yellow  Yellow   Appearance Fluid Clear  Clear   Albumin Fluid Source  Abdomen   Albumin Fluid g/dL 0.3   Protein Fluid Source  Abdomen   Protein Total Fluid g/dL 0.4     CT chest/abdomen/pelvis 11/11/24:  FINDINGS:   LUNGS AND PLEURA: Minimal scattered left upper and lower lobe lobe groundglass and clustered micronodules. Mild to moderate left basilar predominant bandlike opacities, suggestive of atelectasis. Similar sized tiny left pleural effusion. Resolution of   prior right pleural effusion.   MEDIASTINUM/AXILLAE: No thoracic adenopathy.  CORONARY ARTERY CALCIFICATION: Motion artifact.  HEPATOBILIARY: Interval development of diffuse heterogeneous liver enhancement and/or masses. Right and left liver patchy and confluent areas of hypoenhancement are present, most pronounced in the right liver. Superimposed small hypervascular areas are   also noted, including 12 mm hypervascular area along the inferior right liver (series 3, image 142). Cholecystectomy.  PANCREAS: Normal.  SPLEEN: Normal.  ADRENAL GLANDS: Normal.  KIDNEYS/BLADDER: Normal.  BOWEL: Borderline small and large bowel wall thickening, likely from ascites and incomplete distention. No obstruction.  LYMPH NODES: No abdominal or pelvic adenopathy.  VASCULATURE: Nonaneurysmal aorta. Patent draining mesenteric and portal veins.  PELVIC ORGANS: Mild to moderate ascites.  MUSCULOSKELETAL: Surgical changes breasts. Subcutaneous body wall edema. Sclerotic bony metastatic disease again noted.                                                                   IMPRESSION:  1.  Interval development of diffuse heterogeneous liver enhancement. On the 10/18/2024  PET/CT, no abnormal liver activity was identified. Differentials include rapidly evolving hepatic metastatic disease versus other process, to include hepatitis or fibrosis versus heterogeneous steatosis. Recommend liver MRI.  2.  Increased mild to moderate ascites.  3.  Minimal left lung groundglass and clustered micronodules, suggesting infectious / inflammatory change.  4.  Tiny left pleural effusion.    US-guided paracentesis 11/8/24:  IMPRESSION:  1.  Status post ultrasound-guided paracentesis with 2.2 L removed.   Latest Reference Range & Units 11/08/24 12:25   Cell Count Fluid Source  Abdomen   Total Nucleated Cells /uL 28   Absolute Neutrophils, Body Fluid /uL 1.1   % Neutrophils Fluid % 4   % Lymphocytes Fluid % 5   % Mono/Macro Fluid % 77   % Lining Cells % 11   % Other Cells Fluid % 3   Color Fluid Colorless, Yellow  Yellow   Appearance Fluid Clear  Clear   Pathologist Review Comments (Body Fluid)   reactive mesothelial cells present, no malignant cells identified per Dr Clinton Stanley          IMPRESSION:  This is a 49 year old female with a PMH significant for metastatic breast cancer with bone and liver metastases, who presented to the hospital on 11/11 for abdominal pain, found to have likely new liver metastases with recurrent ascites, who we were asked to see for ascites, as well as ongoing nausea, vomiting, and diarrhea.     Metastatic breast cancer with likely worsening liver metastases now with associated ascites  -CT imaging revealed interval development of diffuse heterogeneous liver enhancement, consistent with rapidly evolving hepatic metastatic disease.  This was associated with increased mild to moderate ascites. Diagnostic paracentesis 11/8 negative for malignancy and repeat 11/12 c/w portal hypertension due to liver source with SAAG>1.1 and total protein <2.5, cytology pending. Considered low sodium diet but given she is finally eating better I don't want to discourage her from eating.    -Agree with liver MRI per oncology for further evaluation of new liver findings. Elastography recommended 11/12 but not able to be done at this hospital, which is fine given ascites and ongoing etoh can affect elastography result. If MRI shows cirrhotic morphology no additional work up, such as liver biopsy, would be needed in this regard. Certainly alcohol cessation is needed.     2.  Nausea/vomiting/heartburn/diarrhea  -The patient has had ongoing symptoms that were suspected to be secondary to ongoing cancer treatment.  She was seen by [facility] recently as noted above, at which time she had been off of treatment for 2 weeks, and symptoms had resolved.  She was restarted on the medication oncology after the office visit with [facility], after which her symptoms recurred.  EGD and colonoscopy were notable for possible lymphocytic colitis, but she was not started on budesonide based on the clinic visit note stating her symptoms had resolved.    -Currently her symptoms are somewhat well-controlled with Zofran and Imodium/Lomotil.  C diff ordered to rule this out as cause. She is able to tolerate p.o. for the first time in a while.    -At this time would hold off on starting budesonide while we wait and see if diarrhea is controlled with scheduled Imodium/Lomotil. Agree with continuing the Zofran for nausea and vomiting.  -We could consider something like fiber supplementation to add bulk to the stools in the future if Lomotil does not provide adequate relief  -Additionally reports ongoing reflux/frequent belching. IV PPI started 11/12.      3. History of alcohol abuse:   -Several year history of heavy alcohol use per the patient and her . Last use ~2 months ago. Question of fibrosis secondary to this, but this likely at least contributed to hepatic steatosis seen on recent PET scan. MRI will evaluate this further.     PLAN:  - Agree with MRI of the liver as planned per Oncology  - C diff stool test as ordered  -  Lomotil 1 tablet twice daily with Imodium as needed, consider increasing scheduled Lomotil in coming days  - Continue Zofran for nausea, IV PPI  - Low dose diuretics with furosemide 10 mg and spironolactone 25 mg daily- monitor creatinine/electrolytes  - Boost/Supplement with or between meals  - Alcohol avoidance going forward      (Dr. Nix)  Angelita Pabon PA-C  [facility]  11/13/2024 4:16 PM  [phone] (office)    45 minutes of total time was spent providing patient care, including patient evaluation, reviewing documentation/test results, , and documentation.  ________________________________________________________________________

## 2024-11-13 NOTE — PLAN OF CARE
"Goal Outcome Evaluation:    Problem: Adult Inpatient Plan of Care  Goal: Optimal Comfort and Wellbeing  Outcome: Progressing  Denies pain this morning.    Problem: Liver Failure  Goal: Fluid and Electrolyte Balance  Outcome: Progressing  Goal: Improved Oral Intake  Outcome: Progressing  Liver MRI ordered for today. Per discussion with MRI staff this won't be done until around dinner time. Pt and her family are updated on this timing. This RN requested MRI staff call and update staff an hour prior to MRI because she needs to be premedicated with IV ativan prior. Pt states she will not be getting into the machine unless she is \"totally out\". Hospitalist is aware of these statements. There is a 2nd PRN dose of IV ativan ordered if the patient needs it.     GI and oncology following  GI met with patient and her family at the bedside for awhile this afternoon  Pt has been eating better here in the hospital than she has at home  PRN imodium and lomotil given for ongoing diarrhea and reported by patient                      "

## 2024-11-13 NOTE — DOWNTIME EVENT NOTE
The EMR was down for approximately 2 hours on 11/13/2024.    Jacey Palomino was responsible for completing the paper charting during this time period.     The following information was re-entered into the system by Jacey Treviño RN: MAR, 1 medication was administered during downtime and is now included and up to date in the electronic MAR    The following information will remain in the paper chart: none    Jacey Treviño RN  11/13/2024

## 2024-11-14 ENCOUNTER — APPOINTMENT (OUTPATIENT)
Dept: PHYSICAL THERAPY | Facility: HOSPITAL | Age: 49
DRG: 435 | End: 2024-11-14
Attending: HOSPITALIST
Payer: COMMERCIAL

## 2024-11-14 VITALS
HEART RATE: 97 BPM | DIASTOLIC BLOOD PRESSURE: 59 MMHG | WEIGHT: 138.23 LBS | BODY MASS INDEX: 24.49 KG/M2 | OXYGEN SATURATION: 98 % | RESPIRATION RATE: 18 BRPM | TEMPERATURE: 98.3 F | SYSTOLIC BLOOD PRESSURE: 94 MMHG | HEIGHT: 63 IN

## 2024-11-14 LAB
ALBUMIN SERPL BCG-MCNC: 2.2 G/DL (ref 3.5–5.2)
ALP SERPL-CCNC: 184 U/L (ref 40–150)
ALT SERPL W P-5'-P-CCNC: 13 U/L (ref 0–50)
ANION GAP SERPL CALCULATED.3IONS-SCNC: 7 MMOL/L (ref 7–15)
AST SERPL W P-5'-P-CCNC: 63 U/L (ref 0–45)
BASOPHILS # BLD AUTO: 0 10E3/UL (ref 0–0.2)
BASOPHILS NFR BLD AUTO: 1 %
BILIRUB SERPL-MCNC: 1.7 MG/DL
BUN SERPL-MCNC: 3.2 MG/DL (ref 6–20)
C DIFF TOX B STL QL: NEGATIVE
CALCIUM SERPL-MCNC: 8.6 MG/DL (ref 8.8–10.4)
CHLORIDE SERPL-SCNC: 106 MMOL/L (ref 98–107)
CREAT SERPL-MCNC: 0.73 MG/DL (ref 0.51–0.95)
EGFRCR SERPLBLD CKD-EPI 2021: >90 ML/MIN/1.73M2
EOSINOPHIL # BLD AUTO: 0.2 10E3/UL (ref 0–0.7)
EOSINOPHIL NFR BLD AUTO: 6 %
ERYTHROCYTE [DISTWIDTH] IN BLOOD BY AUTOMATED COUNT: 13.4 % (ref 10–15)
GLUCOSE SERPL-MCNC: 86 MG/DL (ref 70–99)
HCO3 SERPL-SCNC: 25 MMOL/L (ref 22–29)
HCT VFR BLD AUTO: 32.7 % (ref 35–47)
HGB BLD-MCNC: 11.4 G/DL (ref 11.7–15.7)
IMM GRANULOCYTES # BLD: 0 10E3/UL
IMM GRANULOCYTES NFR BLD: 1 %
LABORATORY REPORT: NORMAL
LYMPHOCYTES # BLD AUTO: 0.6 10E3/UL (ref 0.8–5.3)
LYMPHOCYTES NFR BLD AUTO: 15 %
MCH RBC QN AUTO: 41 PG (ref 26.5–33)
MCHC RBC AUTO-ENTMCNC: 34.9 G/DL (ref 31.5–36.5)
MCV RBC AUTO: 118 FL (ref 78–100)
MONOCYTES # BLD AUTO: 0.4 10E3/UL (ref 0–1.3)
MONOCYTES NFR BLD AUTO: 10 %
NEUTROPHILS # BLD AUTO: 2.6 10E3/UL (ref 1.6–8.3)
NEUTROPHILS NFR BLD AUTO: 67 %
NRBC # BLD AUTO: 0 10E3/UL
NRBC BLD AUTO-RTO: 0 /100
PETH INTERPRETATION: NORMAL
PLATELET # BLD AUTO: 159 10E3/UL (ref 150–450)
PLPETH BLD-MCNC: 19 NG/ML
POPETH BLD-MCNC: 54 NG/ML
POTASSIUM SERPL-SCNC: 3.6 MMOL/L (ref 3.4–5.3)
PROT SERPL-MCNC: 4.8 G/DL (ref 6.4–8.3)
RBC # BLD AUTO: 2.78 10E6/UL (ref 3.8–5.2)
SODIUM SERPL-SCNC: 138 MMOL/L (ref 135–145)
WBC # BLD AUTO: 3.8 10E3/UL (ref 4–11)

## 2024-11-14 PROCEDURE — 99239 HOSP IP/OBS DSCHRG MGMT >30: CPT | Performed by: INTERNAL MEDICINE

## 2024-11-14 PROCEDURE — 250N000013 HC RX MED GY IP 250 OP 250 PS 637: Performed by: HOSPITALIST

## 2024-11-14 PROCEDURE — 97116 GAIT TRAINING THERAPY: CPT | Mod: GP

## 2024-11-14 PROCEDURE — 85025 COMPLETE CBC W/AUTO DIFF WBC: CPT | Performed by: HOSPITALIST

## 2024-11-14 PROCEDURE — 250N000011 HC RX IP 250 OP 636: Performed by: HOSPITALIST

## 2024-11-14 PROCEDURE — 80053 COMPREHEN METABOLIC PANEL: CPT | Performed by: HOSPITALIST

## 2024-11-14 PROCEDURE — 250N000009 HC RX 250: Performed by: PHYSICIAN ASSISTANT

## 2024-11-14 PROCEDURE — 36415 COLL VENOUS BLD VENIPUNCTURE: CPT | Performed by: HOSPITALIST

## 2024-11-14 PROCEDURE — 250N000013 HC RX MED GY IP 250 OP 250 PS 637: Performed by: PHYSICIAN ASSISTANT

## 2024-11-14 RX ORDER — SPIRONOLACTONE 25 MG/1
25 TABLET ORAL DAILY
Qty: 30 TABLET | Refills: 0 | Status: SHIPPED | OUTPATIENT
Start: 2024-11-15 | End: 2024-12-15

## 2024-11-14 RX ORDER — ONDANSETRON 4 MG/1
4 TABLET, ORALLY DISINTEGRATING ORAL EVERY 8 HOURS PRN
Qty: 20 TABLET | Refills: 0 | Status: SHIPPED | OUTPATIENT
Start: 2024-11-14

## 2024-11-14 RX ORDER — LANOLIN ALCOHOL/MO/W.PET/CERES
100 CREAM (GRAM) TOPICAL DAILY
Qty: 90 TABLET | Refills: 0 | Status: SHIPPED | OUTPATIENT
Start: 2024-11-15 | End: 2025-02-13

## 2024-11-14 RX ORDER — FUROSEMIDE 20 MG/1
10 TABLET ORAL DAILY
Qty: 15 TABLET | Refills: 0 | Status: SHIPPED | OUTPATIENT
Start: 2024-11-15 | End: 2024-12-15

## 2024-11-14 RX ORDER — LOPERAMIDE HYDROCHLORIDE 2 MG/1
2 CAPSULE ORAL 4 TIMES DAILY PRN
Qty: 30 CAPSULE | Refills: 0 | Status: SHIPPED | OUTPATIENT
Start: 2024-11-14

## 2024-11-14 RX ORDER — DIPHENOXYLATE HYDROCHLORIDE AND ATROPINE SULFATE 2.5; .025 MG/1; MG/1
1 TABLET ORAL 2 TIMES DAILY
Qty: 60 TABLET | Refills: 0 | Status: SHIPPED | OUTPATIENT
Start: 2024-11-14 | End: 2024-12-14

## 2024-11-14 RX ADMIN — PANTOPRAZOLE SODIUM 40 MG: 40 INJECTION, POWDER, FOR SOLUTION INTRAVENOUS at 09:18

## 2024-11-14 RX ADMIN — ONDANSETRON 4 MG: 2 INJECTION INTRAMUSCULAR; INTRAVENOUS at 12:12

## 2024-11-14 RX ADMIN — FUROSEMIDE 10 MG: 20 TABLET ORAL at 09:15

## 2024-11-14 RX ADMIN — THIAMINE HCL TAB 100 MG 100 MG: 100 TAB at 09:15

## 2024-11-14 RX ADMIN — SPIRONOLACTONE 25 MG: 25 TABLET, FILM COATED ORAL at 09:15

## 2024-11-14 RX ADMIN — ONDANSETRON 4 MG: 2 INJECTION INTRAMUSCULAR; INTRAVENOUS at 09:19

## 2024-11-14 RX ADMIN — DIPHENOXYLATE HYDROCHLORIDE AND ATROPINE SULFATE 1 TABLET: .025; 2.5 TABLET ORAL at 09:15

## 2024-11-14 ASSESSMENT — ACTIVITIES OF DAILY LIVING (ADL)
ADLS_ACUITY_SCORE: 0

## 2024-11-14 NOTE — PROGRESS NOTES
"GASTROENTEROLOGY PROGRESS NOTE     SUBJECTIVE: diarrhea controlled with lomotil and imodium. C diff testing is negative.   at bedside.Just returned from MRI of liver. LFT better. LE edema ongoing.     OBJECTIVE:   BP 94/59 (BP Location: Right arm)   Pulse 97   Temp 98.3  F (36.8  C) (Oral)   Resp 18   Ht 1.6 m (5' 3\")   Wt 62.7 kg (138 lb 3.7 oz)   SpO2 98%   BMI 24.49 kg/m     Temp (24hrs), Av.9  F (36.6  C), Min:97.6  F (36.4  C), Max:98.3  F (36.8  C)     Patient Vitals for the past 72 hrs:   Weight   24 1607 62.7 kg (138 lb 3.7 oz)        Intake/Output Summary (Last 24 hours) at 2024 1241  Last data filed at 2024 0929  Gross per 24 hour   Intake 160 ml   Output 625 ml   Net -465 ml      PHYSICAL EXAM  Constitutional: ill appearing female with ascites and jaundice/icterus,  in bed, no acute distress  Cardiovascular: Regular rate and rhythm.   Respiratory: respirations non labored.   Abdomen: Soft, non-tender,    I have reviewed the patient's new clinical lab results:   Recent Labs   Lab Test 24  0624  1240 24  1036 24  1110   WBC 3.8*  --  6.0 7.5   HGB 11.4*  --  14.2 12.8   *  --  116* 116*     --  229 208   INR  --  1.54*  --   --       Recent Labs   Lab Test 24  0625 24  0831 24  0710    139 136   POTASSIUM 3.6 5.0 3.0*   CHLORIDE 106 107 101   CO2 25 23 26   BUN 3.2* 4.7* 6.7   CR 0.73 0.70 0.78   ANIONGAP 7 9 9   ADELINA 8.6* 8.7* 8.6*      Recent Labs   Lab Test 24  0625 24  0711 24  1240 24  1036 24  1110   ALBUMIN 2.2*  --  2.8* 2.7* 2.6*   BILITOTAL 1.7*  --  2.2* 2.0* 1.9*   ALT 13  --  12  --  9   AST 63*  --  103*  --  79*   ALKPHOS 184*  --  227* 211* 199*   PROTEIN  --  Negative  --   --   --    LIPASE  --   --  29  --  19        IMPRESSION:  This is a 49 year old female with a PMH significant for metastatic breast cancer with bone and liver metastases, who presented to the " [facility]"" on 11/11 for abdominal pain, found to have likely new liver metastases with recurrent ascites, who we were asked to see for ascites, as well as ongoing nausea, vomiting, and diarrhea.     Metastatic breast cancer with likely worsening liver metastases now with associated ascites  -CT imaging revealed interval development of diffuse heterogeneous liver enhancement, consistent with rapidly evolving hepatic metastatic disease.  This was associated with increased mild to moderate ascites. Diagnostic paracentesis 11/8 negative for malignancy and repeat 11/12 c/w portal hypertension due to liver source with SAAG>1.1 and total protein <2.5, cytology pending. Considered low sodium diet but given she is finally eating better I don't want to discourage her from eating.   -Agree with liver MRI per oncology for further evaluation of new liver findings. Elastography recommended 11/12 but not able to be done at this hospital, which is fine given ascites and ongoing etoh can affect elastography result. If MRI shows cirrhotic morphology no additional work up, such as liver biopsy, would be needed in this regard. Certainly alcohol cessation is needed.     2.  Nausea/vomiting/heartburn/diarrhea  -The patient has had ongoing symptoms that were suspected to be secondary to ongoing cancer treatment.  She was seen by [facility] recently as noted above, at which time she had been off of treatment for 2 weeks, and symptoms had resolved.  She was restarted on the medication oncology after the office visit with [facility], after which her symptoms recurred.  EGD and colonoscopy were notable for possible lymphocytic colitis, but she was not started on budesonide based on the clinic visit note stating her symptoms had resolved.    -Currently her symptoms are somewhat well-controlled with Zofran and Imodium/Lomotil.  C diff negative.  She is able to tolerate p.o. at this time.   -At this time would hold off on starting budesonide while we wait and  see if diarrhea is controlled with scheduled Imodium/Lomotil. Agree with continuing the Zofran for nausea and vomiting.  -We could consider something like fiber supplementation to add bulk to the stools in the future if Lomotil does not provide adequate relief  -Additionally reports ongoing reflux/frequent belching. IV PPI started 11/12. Discharge on oral PPI 40 mg daily symptoms have improved.      3. History of alcohol abuse:   -Several year history of heavy alcohol use per the patient and her . Last use ~2 months ago. Question of fibrosis secondary to this, but this likely at least contributed to hepatic steatosis seen on recent PET scan. MRI will evaluate this further.      PLAN:  - Await MRI results per oncology   - continue Lomotil 1 tablet twice daily with Imodium as needed, consider increasing scheduled Lomotil in coming days  - Continue Zofran for nausea, IV PPI- change to oral PPI at discharge.   - Low dose diuretics with furosemide 10 mg and spironolactone 25 mg daily- monitor creatinine/electrolytes- we can check  BMP in 1 week.   - Boost/Supplement with or between meals  - Alcohol avoidance going forward  Hepatology follow up. We will call to schedule.    30 minutes of total time was spent providing patient care, including patient evaluation, reviewing documentation/test result, and .    Okay from GI perspective if she is discharged today - GI will sign off, will arrange outpatient follow up   Missy Sauceda [facility]   Office

## 2024-11-14 NOTE — PLAN OF CARE
Problem: Diarrhea  Goal: Effective Diarrhea Management  Outcome: Progressing   Goal Outcome Evaluation:       1 loose stool this shift. Cdiff sample sent to lab. Denies pain or discomfort. Ativan given before MRI. MRI completed without difficulty.

## 2024-11-14 NOTE — PROGRESS NOTES
Care Management Discharge Note    Discharge Date: 11/14/2024       Discharge Disposition: Home, Home Care    Discharge Services: Home Care    Discharge Transportation: family or friend will provide    Private pay costs discussed: Not applicable    Does the patient's insurance plan have a 3 day qualifying hospital stay waiver?  No    PAS Confirmation Code: n/a    Patient/family educated on Medicare website which has current facility and service quality ratings:  n/a    Education Provided on the Discharge Plan: Yes    Persons Notified of Discharge Plans: home care agency, pt/family, RN     Patient/Family in Agreement with the Plan: yes    Handoff Referral Completed: No, handoff not indicated or clinically appropriate    Additional Information:  Provider notified RNCM patient would like to discharge. Provider confirmed HC agency is confirmed. Provider will complete discharge orders for pt to discharge.   RNCM sent discharge orders for Tiffani.      Malu Zapata RN

## 2024-11-14 NOTE — PLAN OF CARE
Goal Outcome Evaluation:    Pt was discharged to home, instructions were explained and both pt and  verbalized understanding.

## 2024-11-14 NOTE — PROGRESS NOTES
CLINICAL NUTRITION SERVICES - BRIEF NOTE    EVALUATION OF THE PROGRESS TOWARD GOALS   Diet: Regular  Food Allergy: Shellfish   Nutrition Supplement: Gelatein Plus daily   Intake/Tolerance: Poor     Pt did not receive Gel Plus supplement   Pt consuming 25-50% of meals this admit     NEW FINDINGS   Pt asleep during visit. Pt spouse in room. Pt spouse reports pt with improving po intakes today- ate most of mac and cheese. Discussed strategies to increase calorie and protein intakes, protein powders, nutrition supplements.     GI following with hx alc abuse, N/V/D, heartburn, ascites.   - Pt now on Scheduled lasix, lomitol, zofran, aldactone + PRN imodium lomitol and zofran.     11/12 paracentesis, 2.45 L OP    Ascites, jaundiced   Distended abd  Mild edema BLE   BM: x2 11/13   Most Recent Weight: 62.7 kg (138 lb 3.7 oz)    Weight History:   Wt Readings from Last 10 Encounters:   11/11/24 62.7 kg (138 lb 3.7 oz)   11/11/24 64.4 kg (142 lb)   11/08/24 64.4 kg (142 lb)      Malnutrition Diagnosis: Severe malnutrition  In Context of:  Acute illness or injury on chronic    INTERVENTIONS  Implementation  Medical food supplement therapy - Gel Plus daily   Modify composition of meals/snacks- Small frequent meals     Consider MVI/M daily r/t inadequate po intakes, not meeting RDIs     Monitoring/Evaluation  Progress toward goals will be monitored and evaluated per protocol.

## 2024-11-14 NOTE — PLAN OF CARE
Problem: Adult Inpatient Plan of Care  Goal: Optimal Comfort and Wellbeing  Outcome: Progressing     Problem: Diarrhea  Goal: Effective Diarrhea Management  Outcome: Progressing     Problem: Surgery Nonspecified  Goal: Optimal Pain Control and Function  Outcome: Progressing   Goal Outcome Evaluation:       Pt is A/O x4, denies pain and discomfort. VSS continue to monitor.Sylvia Friedman RN

## 2024-11-14 NOTE — DISCHARGE SUMMARY
[facility] [place]  Hospitalist Discharge Summary      Date of Admission:  11/11/2024  Date of Discharge:  11/14/2024  Discharging Provider: Yemi Smith MD  Discharge Service: Hospitalist Service    Discharge Diagnoses   Severe malnutrition with failure to thrive  Malignant ascites s/p paracentesis  Starvation ketosis  Metastatic breast cancer  Hypokalemia, replaced  Hyponatremia, improved  Tachycardia, resolved  Weakness and deconditioning  Persistent diarrhea    Clinically Significant Risk Factors     # Severe Malnutrition: based on nutrition assessment      Follow-ups Needed After Discharge   Follow-up Appointments       Follow Up      Follow up with oncology and GI as scheduled        Hospital Follow-up with Existing Primary Care Provider (PCP)      Please see details below         Schedule Primary Care visit within: 14 Days               Unresulted Labs Ordered in the Past 30 Days of this Admission       Date and Time Order Name Status Description    11/12/2024  2:03 PM Phosphatidylethanol (PEth), Whole Blood In process     11/12/2024  2:02 PM Cytology, non-gynecologic In process         These results will be followed up by oncology and GI    Discharge Disposition   Discharged to home with home care  Condition at discharge: Stable    Hospital Course   49 years old female with a past medical history of metastatic breast cancer on chemotherapy who presented to the ER for the evaluation of weakness and abdominal distention, patient was admitted with failure to thrive severe malnutrition and malignant ascites.  GI and oncology were consulted, patient had paracentesis on 11/8/2024.  Negative infection and peritoneal culture, oncology recommended liver MRI for metastasis and was done but results still pending till discharge time.  Electrolytes replaced and patient was started on low-dose of Lasix and spironolactone.  Patient diarrhea did improve with Lomotil  GI cleared patient and discharge with  scheduled Lomotil and Imodium as needed.  Patient will continue to follow-up with oncology and GI as outpatient.  PT/OT evaluation was done patient needed home care on discharge.      Consultations This Hospital Stay   NUTRITION SERVICES ADULT IP CONSULT  CARE MANAGEMENT / SOCIAL WORK IP CONSULT  PHYSICAL THERAPY ADULT IP CONSULT  OCCUPATIONAL THERAPY ADULT IP CONSULT  HEMATOLOGY & ONCOLOGY IP CONSULT  CARE MANAGEMENT / SOCIAL WORK IP CONSULT  GASTROENTEROLOGY IP CONSULT    Code Status   Full Code    Time Spent on this Encounter   IYemi MD, personally saw the patient today and spent greater than 30 minutes discharging this patient.       Yemi Smith MD  [address] [address]  Phone: [phone]  Fax: [phone]  ______________________________________________________________________    Physical Exam   Vital Signs: Temp: 98.3  F (36.8  C) Temp src: Oral BP: 94/59 Pulse: 97   Resp: 18 SpO2: 98 % O2 Device: None (Room air)    Weight: 138 lbs 3.65 oz  General Appearance: Sleeping comfortable in bed in no acute respiratory distress   Respiratory: Normal breath sounds no wheeze, no rales.  Cardiovascular: Normal heart sound, no murmur.  GI: Soft, mild distention, no tenderness.  Skin: Dry, warm, no rash.  Other: Grossly intact, no focal deficit.       Primary Care Physician   Dario Lan    Discharge Orders      Home Care Referral      Reason for your hospital stay    Nausea , ascites     Activity    Your activity upon discharge: activity as tolerated     Follow Up    Follow up with oncology and GI as scheduled     Diet    Follow this diet upon discharge: Current Diet:Orders Placed This Encounter      Snacks/Supplements Adult: Gelatein Plus; Between Meals      Regular Diet Adult     Hospital Follow-up with Existing Primary Care Provider (PCP)    Please see details below            Significant Results and Procedures   Most Recent 3  CBC's:  Recent Labs   Lab Test 11/14/24  0625 11/11/24  1036 11/08/24  1110   WBC 3.8* 6.0 7.5   HGB 11.4* 14.2 12.8   * 116* 116*    229 208     Most Recent 3 BMP's:  Recent Labs   Lab Test 11/14/24  0625 11/13/24  0831 11/12/24  1207 11/12/24  0821 11/12/24  0710    139  --   --  136   POTASSIUM 3.6 5.0  --   --  3.0*   CHLORIDE 106 107  --   --  101   CO2 25 23  --   --  26   BUN 3.2* 4.7*  --   --  6.7   CR 0.73 0.70  --   --  0.78   ANIONGAP 7 9  --   --  9   ADELINA 8.6* 8.7*  --   --  8.6*   GLC 86 84 131*   < > 122*    < > = values in this interval not displayed.     Most Recent 2 LFT's:  Recent Labs   Lab Test 11/14/24  0625 11/11/24  1240   AST 63* 103*   ALT 13 12   ALKPHOS 184* 227*   BILITOTAL 1.7* 2.2*   ,   Results for orders placed or performed during the hospital encounter of 11/11/24   US Paracentesis with Albumin    Narrative    EXAM:  1. PARACENTESIS  2. ULTRASOUND GUIDANCE  LOCATION: [facility]  DATE: 11/12/2024    INDICATION: Ascites.    PROCEDURE: Informed consent obtained. Time out performed. The abdomen was prepped and draped in a sterile fashion. 10 mL of 1% lidocaine was infused into local soft tissues. A 5 Grenadian catheter system was introduced into the abdominal ascites under   ultrasound guidance.    2.45 liters of clear yellow fluid were removed and sent to lab if requested.    Patient tolerated procedure well.    Ultrasound imaging was obtained and placed in the patient's permanent medical record.      Impression    IMPRESSION:  1.  Status post ultrasound-guided paracentesis.    Reference CPT Code: 93266       Discharge Medications   Current Discharge Medication List        START taking these medications    Details   diphenoxylate-atropine (LOMOTIL) 2.5-0.025 MG tablet Take 1 tablet by mouth 2 times daily.  Qty: 60 tablet, Refills: 0    Associated Diagnoses: Diarrhea, unspecified type      furosemide (LASIX) 20 MG tablet Take 0.5 tablets (10 mg)  by mouth daily.  Qty: 15 tablet, Refills: 0    Associated Diagnoses: Malignant ascites (H)      loperamide (IMODIUM) 2 MG capsule Take 1 capsule (2 mg) by mouth 4 times daily as needed for diarrhea.  Qty: 30 capsule, Refills: 0    Associated Diagnoses: Diarrhea, unspecified type      spironolactone (ALDACTONE) 25 MG tablet Take 1 tablet (25 mg) by mouth daily.  Qty: 30 tablet, Refills: 0    Associated Diagnoses: Malignant ascites (H)      thiamine (B-1) 100 MG tablet Take 1 tablet (100 mg) by mouth daily.  Qty: 90 tablet, Refills: 0    Associated Diagnoses: Failure to thrive in adult           CONTINUE these medications which have CHANGED    Details   ondansetron (ZOFRAN ODT) 4 MG ODT tab Take 1 tablet (4 mg) by mouth every 8 hours as needed for nausea.  Qty: 20 tablet, Refills: 0    Associated Diagnoses: Nausea           CONTINUE these medications which have NOT CHANGED    Details   gabapentin (NEURONTIN) 300 MG capsule Take 300 mg by mouth at bedtime.           Allergies   Allergies   Allergen Reactions    Azithromycin Hives     Hives to bilateral arms     Hives to bilateral arms    Bee Venom Swelling     Severe swelling, locally.    Sertraline Nausea and Vomiting     Severe nausea    Shellfish-Derived Products Anaphylaxis    Adhesive Tape Rash     Skin Glue at port site placement.

## 2024-11-16 ENCOUNTER — PATIENT OUTREACH (OUTPATIENT)
Dept: CARE COORDINATION | Facility: CLINIC | Age: 49
End: 2024-11-16
Payer: COMMERCIAL

## 2024-11-16 NOTE — PROGRESS NOTES
Clinic Care Coordination Contact  [facility]/Voicemail    Clinical Data: Care Coordinator Outreach    Outreach Documentation Number of Outreach Attempt   11/16/2024   9:52 AM 2       Left message on patient's voicemail with call back information and requested return call.    Care Coordinator will do no further outreaches at this time.    Chely Burt  [phone]  Care

## 2024-11-18 LAB
PATH REPORT.COMMENTS IMP SPEC: NORMAL
PATH REPORT.FINAL DX SPEC: NORMAL
PATH REPORT.GROSS SPEC: NORMAL
PATH REPORT.MICROSCOPIC SPEC OTHER STN: NORMAL
PATH REPORT.RELEVANT HX SPEC: NORMAL

## 2024-11-21 ENCOUNTER — HOSPITAL ENCOUNTER (EMERGENCY)
Facility: CLINIC | Age: 49
Discharge: HOME | End: 2024-11-21
Attending: EMERGENCY MEDICINE
Payer: COMMERCIAL

## 2024-11-21 VITALS
DIASTOLIC BLOOD PRESSURE: 59 MMHG | OXYGEN SATURATION: 99 % | TEMPERATURE: 97.7 F | RESPIRATION RATE: 15 BRPM | SYSTOLIC BLOOD PRESSURE: 92 MMHG | HEART RATE: 117 BPM

## 2024-11-21 DIAGNOSIS — R18.8 OTHER ASCITES: ICD-10-CM

## 2024-11-21 DIAGNOSIS — R19.7 DIARRHEA, UNSPECIFIED TYPE: ICD-10-CM

## 2024-11-21 LAB
ALBUMIN SERPL BCG-MCNC: 2.4 G/DL (ref 3.5–5.2)
ALP SERPL-CCNC: 231 U/L (ref 40–150)
ALT SERPL W P-5'-P-CCNC: 14 U/L (ref 0–50)
ANION GAP SERPL CALCULATED.3IONS-SCNC: 8 MMOL/L (ref 7–15)
AST SERPL W P-5'-P-CCNC: 79 U/L (ref 0–45)
BASOPHILS # BLD AUTO: 0 10E3/UL (ref 0–0.2)
BASOPHILS NFR BLD AUTO: 1 %
BILIRUB SERPL-MCNC: 2.7 MG/DL
BUN SERPL-MCNC: 8.1 MG/DL (ref 6–20)
CALCIUM SERPL-MCNC: 8.7 MG/DL (ref 8.8–10.4)
CHLORIDE SERPL-SCNC: 96 MMOL/L (ref 98–107)
CREAT SERPL-MCNC: 0.95 MG/DL (ref 0.51–0.95)
EGFRCR SERPLBLD CKD-EPI 2021: 74 ML/MIN/1.73M2
EOSINOPHIL # BLD AUTO: 0.1 10E3/UL (ref 0–0.7)
EOSINOPHIL NFR BLD AUTO: 1 %
ERYTHROCYTE [DISTWIDTH] IN BLOOD BY AUTOMATED COUNT: 14.4 % (ref 10–15)
GLUCOSE SERPL-MCNC: 102 MG/DL (ref 70–99)
HCO3 SERPL-SCNC: 26 MMOL/L (ref 22–29)
HCT VFR BLD AUTO: 30.5 % (ref 35–47)
HGB BLD-MCNC: 10.8 G/DL (ref 11.7–15.7)
HOLD SPECIMEN: NORMAL
IMM GRANULOCYTES # BLD: 0 10E3/UL
IMM GRANULOCYTES NFR BLD: 1 %
LACTATE SERPL-SCNC: 2.2 MMOL/L (ref 0.7–2)
LYMPHOCYTES # BLD AUTO: 0.5 10E3/UL (ref 0.8–5.3)
LYMPHOCYTES NFR BLD AUTO: 8 %
MAGNESIUM SERPL-MCNC: 1.8 MG/DL (ref 1.7–2.3)
MCH RBC QN AUTO: 41.1 PG (ref 26.5–33)
MCHC RBC AUTO-ENTMCNC: 35.4 G/DL (ref 31.5–36.5)
MCV RBC AUTO: 116 FL (ref 78–100)
MONOCYTES # BLD AUTO: 0.6 10E3/UL (ref 0–1.3)
MONOCYTES NFR BLD AUTO: 10 %
NEUTROPHILS # BLD AUTO: 4.8 10E3/UL (ref 1.6–8.3)
NEUTROPHILS NFR BLD AUTO: 80 %
NRBC # BLD AUTO: 0 10E3/UL
NRBC BLD AUTO-RTO: 0 /100
PLATELET # BLD AUTO: 218 10E3/UL (ref 150–450)
POTASSIUM SERPL-SCNC: 4 MMOL/L (ref 3.4–5.3)
PROT SERPL-MCNC: 5.2 G/DL (ref 6.4–8.3)
RBC # BLD AUTO: 2.63 10E6/UL (ref 3.8–5.2)
SODIUM SERPL-SCNC: 130 MMOL/L (ref 135–145)
WBC # BLD AUTO: 6 10E3/UL (ref 4–11)

## 2024-11-21 PROCEDURE — 85025 COMPLETE CBC W/AUTO DIFF WBC: CPT | Performed by: EMERGENCY MEDICINE

## 2024-11-21 PROCEDURE — 258N000003 HC RX IP 258 OP 636: Performed by: EMERGENCY MEDICINE

## 2024-11-21 PROCEDURE — 84155 ASSAY OF PROTEIN SERUM: CPT | Performed by: EMERGENCY MEDICINE

## 2024-11-21 PROCEDURE — 83605 ASSAY OF LACTIC ACID: CPT | Performed by: EMERGENCY MEDICINE

## 2024-11-21 PROCEDURE — 36415 COLL VENOUS BLD VENIPUNCTURE: CPT | Performed by: EMERGENCY MEDICINE

## 2024-11-21 PROCEDURE — 82040 ASSAY OF SERUM ALBUMIN: CPT | Performed by: EMERGENCY MEDICINE

## 2024-11-21 PROCEDURE — 83735 ASSAY OF MAGNESIUM: CPT | Performed by: EMERGENCY MEDICINE

## 2024-11-21 PROCEDURE — 99283 EMERGENCY DEPT VISIT LOW MDM: CPT | Mod: 25

## 2024-11-21 PROCEDURE — 96360 HYDRATION IV INFUSION INIT: CPT

## 2024-11-21 PROCEDURE — 999N000128 HC STATISTIC PERIPHERAL IV START W/O US GUIDANCE

## 2024-11-21 RX ORDER — TEMAZEPAM 7.5 MG/1
7.5 CAPSULE ORAL
Qty: 10 CAPSULE | Refills: 0 | Status: SHIPPED | OUTPATIENT
Start: 2024-11-21

## 2024-11-21 RX ADMIN — SODIUM CHLORIDE 1000 ML: 9 INJECTION, SOLUTION INTRAVENOUS at 12:38

## 2024-11-21 ASSESSMENT — ACTIVITIES OF DAILY LIVING (ADL)
ADLS_ACUITY_SCORE: 0

## 2024-11-21 NOTE — ED PROVIDER NOTES
Emergency Department Note      History of Present Illness     Chief Complaint   Abdominal Pain and Diarrhea    HPI   Emilie Urena is a 49 year old female with a history of breast cancer metastatic to the bone and liver, alcohol abuse, portal hypertension, colitis, and depression who presents for evaluation of generalized weakness. The patient reports that she has been having four to five episodes of non blood diarrhea daily, thought to be due to colitis, regardless of taking Lomotil and Imodium. States that she also has trouble sleeping. Adds that she has abdominal discomfort and last had a paracentesis for malignant ascites six days ago, and has been getting them weekly before that. She has a paracentesis as well as a liver biopsy scheduled for tomorrow. She has not been started on chemotherapy yet. She has not been vomiting.     Independent Historian   None    Review of External Notes   I reviewed the hospital discharge summary from 11/14/24 when she was admitted for severe malnutrition and failure to thrive and underwent paracentesis for malignant ascites.   I reviewed the liver MRI from 11/13/24 which shows signs of choledocholithiasis.     Past Medical History     Medical History and Problem List   Metastatic breast cancer  Alcohol abuse   Anxiety   Colitis  Depression  Secondary malignant neoplasm of bone  Secondary malignant neoplasm of liver  Portal hypertension     Medications   Lomotil   Furosemide  Loperamide  Ondansetron  Spironolactone   Gabapentin     Surgical History   Paracentesis   Laparoscopic cholecystectomy   Lymph node biopsy   Breast biopsy   Laparoscopic hysterectomy     Physical Exam     Patient Vitals for the past 24 hrs:   BP Temp Temp src Pulse Resp SpO2   11/21/24 1343 92/59 -- -- 117 -- --   11/21/24 1050 -- -- -- 102 15 99 %   11/21/24 1030 -- -- -- 108 13 97 %   11/21/24 1020 98/63 97.7  F (36.5  C) Oral (!) 46 12 --     Physical Exam  ***    Diagnostics     Lab Results   Labs  Ordered and Resulted from Time of ED Arrival to Time of ED Departure   COMPREHENSIVE METABOLIC PANEL - Abnormal       Result Value    Sodium 130 (*)     Potassium 4.0      Carbon Dioxide (CO2) 26      Anion Gap 8      Urea Nitrogen 8.1      Creatinine 0.95      GFR Estimate 74      Calcium 8.7 (*)     Chloride 96 (*)     Glucose 102 (*)     Alkaline Phosphatase 231 (*)     AST 79 (*)     ALT 14      Protein Total 5.2 (*)     Albumin 2.4 (*)     Bilirubin Total 2.7 (*)    LACTIC ACID WHOLE BLOOD WITH 1X REPEAT IN 2 HR WHEN >2 - Abnormal    Lactic Acid, Initial 2.2 (*)    CBC WITH PLATELETS AND DIFFERENTIAL - Abnormal    WBC Count 6.0      RBC Count 2.63 (*)     Hemoglobin 10.8 (*)     Hematocrit 30.5 (*)      (*)     MCH 41.1 (*)     MCHC 35.4      RDW 14.4      Platelet Count 218      % Neutrophils 80      % Lymphocytes 8      % Monocytes 10      % Eosinophils 1      % Basophils 1      % Immature Granulocytes 1      NRBCs per 100 WBC 0      Absolute Neutrophils 4.8      Absolute Lymphocytes 0.5 (*)     Absolute Monocytes 0.6      Absolute Eosinophils 0.1      Absolute Basophils 0.0      Absolute Immature Granulocytes 0.0      Absolute NRBCs 0.0     MAGNESIUM - Normal    Magnesium 1.8     LACTIC ACID WHOLE BLOOD       Imaging   No orders to display     Independent Interpretation   None    ED Course      Medications Administered   Medications   sodium chloride 0.9% BOLUS 1,000 mL (0 mLs Intravenous Stopped 11/21/24 1339)       Procedures   Procedures     Discussion of Management   None    ED Course   ED Course as of 11/21/24 1349   Thu Nov 21, 2024   1022 I obtained history and examined the patient as noted above.    1233 I rechecked and updated the patient.    1319 I rechecked and updated the patient.    1319 I spoke with IR regarding paracentesis.    1323 I rechecked and updated the patient.        Additional Documentation  None    Medical Decision Making / Diagnosis     CMS Diagnoses: IV Antibiotics given  and/or elevated Lactate of 2.2 and no sepsis note found - Delete this reminder and enter the sepsis note or '.edcms' before signing chart.>>>Lactic acid elevated due to diarrhea and dehydration. At this time there are no signs of sepsis or septic shock    MIPS       None    MDM   Emilie Urena is a 49 year old female ***    Disposition   The patient was discharged.     Diagnosis     ICD-10-CM    1. Other ascites  R18.8       2. Diarrhea, unspecified type  R19.7            Scribe Disclosure:  I, Yue Holloway, am serving as a scribe at 10:28 AM on 11/21/2024 to document services personally performed by Les Lynne MD based on my observations and the provider's statements to me.      consider return to the emergency room.  Patient agreed to the plan and was discharged home.  At discharge patient was drinking normally including Coca-Cola and felt comfortable with the plan.    Disposition   The patient was discharged.     Diagnosis     ICD-10-CM    1. Other ascites  R18.8       2. Diarrhea, unspecified type  R19.7            Scribe Disclosure:  I, Yue Holloway, am serving as a scribe at 10:28 AM on 11/21/2024 to document services personally performed by Les Lynne MD based on my observations and the provider's statements to me.        Les Lynne MD  11/22/24 [id number]

## 2024-11-21 NOTE — CONSULTS
Consult received for liver biopsy. Patient presented to the ED for diarrhea and suspected colitis. Reviewed imaging. Multiple probable liver mets with already scheduled liver biopsy as an out patient for tomorrow. Would recommend keeping this appointment. Can plan for paracentesis today. This was discussed with Dr. Lynne.     Larry Arnold MD

## 2024-11-21 NOTE — ED TRIAGE NOTES
Patient has been having intermittent abd pain and diarrhea over the last week. Patient was just discharged from the hospital last week. Patient does have hx of ascites.      Triage Assessment (Adult)       Row Name 11/21/24 1028          Triage Assessment    Airway WDL WDL        Respiratory WDL    Respiratory WDL WDL        Skin Circulation/Temperature WDL    Skin Circulation/Temperature WDL WDL        Cardiac WDL    Cardiac WDL WDL        Peripheral/Neurovascular WDL    Peripheral Neurovascular WDL WDL        Cognitive/Neuro/Behavioral WDL    Cognitive/Neuro/Behavioral WDL WDL

## 2024-11-21 NOTE — DISCHARGE INSTRUCTIONS
We have discussed the option for paracentesis today.  You have an appointment for both paracentesis and liver biopsy tomorrow.  Please make that appointment as we have discussed the hospital is unavailable to accommodate you for a positive biopsy in the hospital today or tomorrow.  If you develop a fever or severe worsening abdominal pain return to the emergency room for reassessment.  Thanks for your patience

## 2024-12-15 ENCOUNTER — HEALTH MAINTENANCE LETTER (OUTPATIENT)
Age: 49
End: 2024-12-15

## 2024-12-22 ENCOUNTER — HOSPITAL ENCOUNTER (EMERGENCY)
Facility: CLINIC | Age: 49
Discharge: HOME | End: 2024-12-22
Attending: EMERGENCY MEDICINE | Admitting: EMERGENCY MEDICINE
Payer: COMMERCIAL

## 2024-12-22 VITALS
HEIGHT: 63 IN | BODY MASS INDEX: 24.45 KG/M2 | HEART RATE: 85 BPM | WEIGHT: 138 LBS | OXYGEN SATURATION: 98 % | SYSTOLIC BLOOD PRESSURE: 121 MMHG | TEMPERATURE: 97 F | DIASTOLIC BLOOD PRESSURE: 84 MMHG | RESPIRATION RATE: 15 BRPM

## 2024-12-22 DIAGNOSIS — K59.00 CONSTIPATION, UNSPECIFIED CONSTIPATION TYPE: ICD-10-CM

## 2024-12-22 DIAGNOSIS — C79.9 METASTATIC MALIGNANT NEOPLASM, UNSPECIFIED SITE (H): ICD-10-CM

## 2024-12-22 DIAGNOSIS — R18.0 MALIGNANT ASCITES (H): ICD-10-CM

## 2024-12-22 DIAGNOSIS — R10.84 GENERALIZED ABDOMINAL PAIN: ICD-10-CM

## 2024-12-22 PROCEDURE — 250N000011 HC RX IP 250 OP 636: Performed by: EMERGENCY MEDICINE

## 2024-12-22 PROCEDURE — 99284 EMERGENCY DEPT VISIT MOD MDM: CPT

## 2024-12-22 RX ORDER — BISACODYL 10 MG
10 SUPPOSITORY, RECTAL RECTAL DAILY PRN
Qty: 5 SUPPOSITORY | Refills: 0 | Status: SHIPPED | OUTPATIENT
Start: 2024-12-22

## 2024-12-22 RX ORDER — POLYETHYLENE GLYCOL 3350 17 G/17G
1 POWDER, FOR SOLUTION ORAL DAILY PRN
Qty: 527 G | Refills: 0 | Status: SHIPPED | OUTPATIENT
Start: 2024-12-22

## 2024-12-22 RX ORDER — BISACODYL 10 MG
10 SUPPOSITORY, RECTAL RECTAL DAILY PRN
Qty: 5 SUPPOSITORY | Refills: 0 | Status: SHIPPED | OUTPATIENT
Start: 2024-12-22 | End: 2024-12-22

## 2024-12-22 RX ORDER — POLYETHYLENE GLYCOL 3350 17 G/17G
1 POWDER, FOR SOLUTION ORAL DAILY PRN
Qty: 527 G | Refills: 0 | Status: SHIPPED | OUTPATIENT
Start: 2024-12-22 | End: 2024-12-22

## 2024-12-22 RX ORDER — ONDANSETRON 4 MG/1
4 TABLET, ORALLY DISINTEGRATING ORAL ONCE
Status: COMPLETED | OUTPATIENT
Start: 2024-12-22 | End: 2024-12-22

## 2024-12-22 RX ADMIN — ONDANSETRON 4 MG: 4 TABLET, ORALLY DISINTEGRATING ORAL at 14:18

## 2024-12-22 ASSESSMENT — ACTIVITIES OF DAILY LIVING (ADL): ADLS_ACUITY_SCORE: 55

## 2024-12-22 NOTE — ED PROVIDER NOTES
Emergency Department Note      History of Present Illness     Chief Complaint  Abdominal Pain      HPI  Emilie Urena is a 49 year old female with a history of breast cancer with metastases to the bone and liver, alcohol abuse and cirrhosis with ascites who presents to the ED with constipation and diffuse abdominal pain over the past 3-4 days.  She has had bouts of this in the past, and believes this is unrelated to her ascites, which was last drained last week (2.1 L), with volume of ascites decreasing recently which she and her  attribute to her following a low-salt diet.  She has not tried an enema or had any recent opioids, but did try a single dose of Dulcolax at home yesterday with no relief.  She did start a low-sodium diet about 2 weeks ago, and family wonders if this has attributed to her symptoms. No alcohol use. Regarding her metastasized cancer, she is currently not a candidate for chemotherapy, but is planning to begin a new treatment soon via [facility].  She does not want to do any test, she thinks she should be treated for constipation and discharge.    Independent Historian  Patient and family at bedside, who contribute to history above.   at bedside, who also thinks that her symptoms are due to constipation, and would like her to be prescribed some additional medications.    Review of External Notes  I reviewed the discharge summary from 11/14/2024.  Admitted for failure to thrive with malignant ascites s/p paracentesis.    Past Medical History   Medical History and Problem List  Breast cancer  Failure to thrive  Malignant ascites  Hyponatremia  Peripheral polyneuropathy  Alcohol abuse  Alcoholic cirrhosis  Chemotherapy-induced thrombocytopenia  Colitis  Depression and anxiety  GERD  Insomnia  Anemia  Drug-induced neutropenia  Malignant neoplasm to the liver and bone  Portal hypertension    Medications  Lasix  Neurontin  Zofran  Imodium  Restoril  Aldactone    Physical  "Exam   Patient Vitals for the past 24 hrs:   BP Temp Pulse Resp SpO2 Height Weight   12/22/24 1508 121/84 -- 85 -- -- -- --   12/22/24 1412 118/87 97  F (36.1  C) 94 15 98 % 1.6 m (5' 3\") 62.6 kg (138 lb)     Physical Exam  General: Chronically ill-appearing woman recumbent in the gurney in room 29,  daughter and another  at bedside  HENT: mucous membranes moist   CV: rate as above, no murmur audible  Resp: normal effort, speaks in full phrases, no stridor, no cough observed  GI:  abdomen soft, minimal diffuse tenderness without guarding, negative Parish sign, no focal tenderness over McBurney's point, no palpable fluid wave  MSK: no bony tenderness, no CVAT  Skin: appropriately warm and dry, no acute abdominal rash  Neuro: alert, clear speech, oriented, no encephalopathy, symmetric strength in all extremities, no nuchal rigidity  Psych: cooperative    Diagnostics   Lab Results   Labs Ordered and Resulted from Time of ED Arrival to Time of ED Departure - No data to display    ED Course    Medications Administered  Medications   ondansetron (ZOFRAN ODT) ODT tab 4 mg (4 mg Oral $Given 12/22/24 1418)     Discussion of Management  None    Social Determinants of Health adding to complexity of care  History of substance abuse, influencing complexity of care.    ED Course  ED Course as of 12/22/24 2154   Sun Dec 22, 2024   1541 I evaluated the patient.     Medical Decision Making / Diagnosis   MDM  With regard to her generalized abdominal pain, she has not had a bowel movement in multiple days, she and her  feel quite strongly that constipation is the cause of her presenting symptoms.  This is certainly plausible.  She does not have a sensation of fecal impaction and declined to undergo a rectal exam.  She is tolerating oral intake here.  No peritonitis.  She is very complex history including metastatic cancer and prior ascites, though she has maintained her pattern of weekly paracenteses and is not " overdue for this, my suspicion for spontaneous bacterial peritonitis is low.  Certainly no profound degree of ascites such that ascitic volume alone could be blamed for her symptoms.  I discussed the possibility of workup here including blood test, urinalysis and CT scan among others, encouraging needs to be done, the patient and her  expressed a strong and persistent preference for her to be simply treated symptomatically for her constipation.  I discussed the corresponding diagnostic limitations and the possibility of missing a more dangerous etiology, though I do think that there request is reasonable, noting that after multiple days of symptoms, she is afebrile, without peritonitis, tolerating oral intake.  While her overall prognosis is poor given her metastatic cancer, I think it is appropriate to treat presumed constipation with medications prescribed.  She declined my offer to have any medications administered here.  She is welcome back for reassessment at any hour special in light of the fact that she has deferred emergent workup at this time.  She will need to be in close contact with her oncologist and primary care physician as well as gastroenterology for reassessment in the near future.    Disposition  The patient was discharged.     ICD-10 Codes:    ICD-10-CM    1. Generalized abdominal pain  R10.84       2. Constipation, unspecified constipation type  K59.00       3. Metastatic malignant neoplasm, unspecified site (H)  C79.9       4. Malignant ascites (H)  R18.0          Discharge Medications  Discharge Medication List as of 12/22/2024  3:41 PM        START taking these medications    Details   bisacodyl (DULCOLAX) 10 MG suppository Place 1 suppository (10 mg) rectally daily as needed for constipation., Disp-5 suppository, R-0, E-Prescribe      bisacodyl (FLEET) 10 MG/30ML Place 1 Bottle (1 enema) rectally once as needed for constipation., Disp-37 mL, R-0, E-Prescribe      polyethylene glycol  (MIRALAX) 17 GM/Dose powder Take 17 g (1 Capful) by mouth daily as needed for constipation., Disp-527 g, R-0, E-Prescribe           Scribe Disclosure:  I, MORGAN VAZQUEZ, am serving as a scribe at 3:35 PM on 12/22/2024 to document services personally performed by Beto Butler MD based on my observations and the provider's statements to me.     [employer]      Beto Butler MD  12/22/24 [id number]

## 2024-12-22 NOTE — ED TRIAGE NOTES
Patient arrives with complaints of nausea and abdominal cramping for the last day. Patient has metastatic cancer and gets a weekly paracentesis.

## 2025-01-13 ENCOUNTER — HOSPITAL ENCOUNTER (EMERGENCY)
Facility: CLINIC | Age: 50
Discharge: HOME | End: 2025-01-13
Attending: STUDENT IN AN ORGANIZED HEALTH CARE EDUCATION/TRAINING PROGRAM | Admitting: STUDENT IN AN ORGANIZED HEALTH CARE EDUCATION/TRAINING PROGRAM
Payer: COMMERCIAL

## 2025-01-13 VITALS
TEMPERATURE: 97.7 F | SYSTOLIC BLOOD PRESSURE: 109 MMHG | HEIGHT: 60 IN | RESPIRATION RATE: 17 BRPM | WEIGHT: 128.53 LBS | HEART RATE: 77 BPM | DIASTOLIC BLOOD PRESSURE: 81 MMHG | OXYGEN SATURATION: 100 % | BODY MASS INDEX: 25.23 KG/M2
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
## 2025-01-13 DIAGNOSIS — S01.81XA CHIN LACERATION, INITIAL ENCOUNTER: ICD-10-CM

## 2025-01-13 DIAGNOSIS — R73.9 HYPERGLYCEMIA: ICD-10-CM

## 2025-01-13 DIAGNOSIS — N17.9 ACUTE KIDNEY INJURY: ICD-10-CM

## 2025-01-13 DIAGNOSIS — E11.9 DIABETES MELLITUS, NEW ONSET (H): Primary | ICD-10-CM

## 2025-01-13 PROBLEM — K76.6 PORTAL HYPERTENSION (H): Status: ACTIVE | Noted: 2025-01-13

## 2025-01-13 PROBLEM — F41.9 ANXIETY: Status: ACTIVE | Noted: 2021-12-30

## 2025-01-13 PROBLEM — K76.0 STEATOSIS OF LIVER: Status: ACTIVE | Noted: 2025-01-13

## 2025-01-13 PROBLEM — D22.9 MELANOCYTIC NEVUS OF SKIN: Status: ACTIVE | Noted: 2025-01-13

## 2025-01-13 PROBLEM — K21.9 GASTROESOPHAGEAL REFLUX DISEASE: Status: ACTIVE | Noted: 2024-09-11

## 2025-01-13 PROBLEM — B35.1 ONYCHOMYCOSIS OF TOENAIL: Status: ACTIVE | Noted: 2019-05-31

## 2025-01-13 PROBLEM — K70.31 ALCOHOLIC CIRRHOSIS OF LIVER WITH ASCITES (H): Status: ACTIVE | Noted: 2025-01-13

## 2025-01-13 PROBLEM — F10.10 ALCOHOL ABUSE: Status: ACTIVE | Noted: 2025-01-13

## 2025-01-13 PROBLEM — K52.9 COLITIS: Status: ACTIVE | Noted: 2025-01-13

## 2025-01-13 PROBLEM — E53.8 COBALAMIN DEFICIENCY: Status: ACTIVE | Noted: 2025-01-13

## 2025-01-13 PROBLEM — C78.7 MALIGNANT NEOPLASM METASTATIC TO LIVER (H): Status: ACTIVE | Noted: 2025-01-13

## 2025-01-13 PROBLEM — R26.9 GAIT ABNORMALITY: Status: ACTIVE | Noted: 2018-05-11

## 2025-01-13 PROBLEM — E86.0 DEHYDRATION: Status: ACTIVE | Noted: 2025-01-13

## 2025-01-13 PROBLEM — N93.9 ABNORMAL UTERINE BLEEDING: Status: ACTIVE | Noted: 2025-01-13

## 2025-01-13 PROBLEM — T45.1X5A CHEMOTHERAPY-INDUCED THROMBOCYTOPENIA: Status: ACTIVE | Noted: 2021-05-19

## 2025-01-13 PROBLEM — C79.51 MALIGNANT NEOPLASM METASTATIC TO BONE (H): Status: ACTIVE | Noted: 2025-01-13

## 2025-01-13 PROBLEM — N65.1 DEFORMITY AND DISPROPORTION OF RECONSTRUCTED BREAST: Status: ACTIVE | Noted: 2017-08-30

## 2025-01-13 PROBLEM — Z91.013 ALLERGY TO SHELLFISH: Status: ACTIVE | Noted: 2024-09-11

## 2025-01-13 PROBLEM — Z91.030 ALLERGY TO BEE STING: Status: ACTIVE | Noted: 2024-09-11

## 2025-01-13 PROBLEM — D64.81 ANEMIA DUE TO AND NOT CONCURRENT WITH CHEMOTHERAPY: Status: ACTIVE | Noted: 2025-01-13

## 2025-01-13 PROBLEM — N65.0 DEFORMITY AND DISPROPORTION OF RECONSTRUCTED BREAST: Status: ACTIVE | Noted: 2017-08-30

## 2025-01-13 PROBLEM — G47.00 INSOMNIA: Status: ACTIVE | Noted: 2025-01-13

## 2025-01-13 PROBLEM — D69.59 CHEMOTHERAPY-INDUCED THROMBOCYTOPENIA: Status: ACTIVE | Noted: 2021-05-19

## 2025-01-13 LAB
ALBUMIN UR-MCNC: NEGATIVE MG/DL
ANION GAP SERPL CALCULATED.3IONS-SCNC: 17 MMOL/L (ref 7–15)
APPEARANCE UR: CLEAR
B-OH-BUTYR SERPL-SCNC: <0.18 MMOL/L
BASE EXCESS BLDV CALC-SCNC: 2.9 MMOL/L (ref -3–3)
BASOPHILS # BLD AUTO: 0 10E3/UL (ref 0–0.2)
BASOPHILS NFR BLD AUTO: 0 %
BILIRUB UR QL STRIP: NEGATIVE
BUN SERPL-MCNC: 30.6 MG/DL (ref 6–20)
CALCIUM SERPL-MCNC: 9.5 MG/DL (ref 8.8–10.4)
CHLORIDE SERPL-SCNC: 91 MMOL/L (ref 98–107)
COLOR UR AUTO: ABNORMAL
CREAT SERPL-MCNC: 1.26 MG/DL (ref 0.51–0.95)
EGFRCR SERPLBLD CKD-EPI 2021: 53 ML/MIN/1.73M2
EOSINOPHIL # BLD AUTO: 0 10E3/UL (ref 0–0.7)
EOSINOPHIL NFR BLD AUTO: 0 %
ERYTHROCYTE [DISTWIDTH] IN BLOOD BY AUTOMATED COUNT: 12.7 % (ref 10–15)
EST. AVERAGE GLUCOSE BLD GHB EST-MCNC: 151 MG/DL
GLUCOSE BLDC GLUCOMTR-MCNC: 431 MG/DL (ref 70–99)
GLUCOSE BLDC GLUCOMTR-MCNC: 581 MG/DL (ref 70–99)
GLUCOSE SERPL-MCNC: 607 MG/DL (ref 70–99)
GLUCOSE UR STRIP-MCNC: >=1000 MG/DL
HBA1C MFR BLD: 6.9 %
HCO3 BLDV-SCNC: 29 MMOL/L (ref 21–28)
HCO3 SERPL-SCNC: 22 MMOL/L (ref 22–29)
HCT VFR BLD AUTO: 37.1 % (ref 35–47)
HGB BLD-MCNC: 12.9 G/DL (ref 11.7–15.7)
HGB UR QL STRIP: NEGATIVE
HOLD SPECIMEN: NORMAL
HOLD SPECIMEN: NORMAL
IMM GRANULOCYTES # BLD: 0.1 10E3/UL
IMM GRANULOCYTES NFR BLD: 1 %
KETONES UR STRIP-MCNC: NEGATIVE MG/DL
LEUKOCYTE ESTERASE UR QL STRIP: NEGATIVE
LYMPHOCYTES # BLD AUTO: 0.2 10E3/UL (ref 0.8–5.3)
LYMPHOCYTES NFR BLD AUTO: 3 %
MCH RBC QN AUTO: 37.5 PG (ref 26.5–33)
MCHC RBC AUTO-ENTMCNC: 34.8 G/DL (ref 31.5–36.5)
MCV RBC AUTO: 108 FL (ref 78–100)
MONOCYTES # BLD AUTO: 0.4 10E3/UL (ref 0–1.3)
MONOCYTES NFR BLD AUTO: 5 %
NEUTROPHILS # BLD AUTO: 7.2 10E3/UL (ref 1.6–8.3)
NEUTROPHILS NFR BLD AUTO: 91 %
NITRATE UR QL: NEGATIVE
NRBC # BLD AUTO: 0 10E3/UL
NRBC BLD AUTO-RTO: 0 /100
O2/TOTAL GAS SETTING VFR VENT: 21 %
OXYHGB MFR BLDV: 40 % (ref 70–75)
PCO2 BLDV: 47 MM HG (ref 40–50)
PH BLDV: 7.4 [PH] (ref 7.32–7.43)
PH UR STRIP: 5 [PH] (ref 5–7)
PLATELET # BLD AUTO: 193 10E3/UL (ref 150–450)
PO2 BLDV: 28 MM HG (ref 25–47)
POTASSIUM SERPL-SCNC: 4.5 MMOL/L (ref 3.4–5.3)
RBC # BLD AUTO: 3.44 10E6/UL (ref 3.8–5.2)
RBC URINE: 1 /HPF
SAO2 % BLDV: 40.3 % (ref 70–75)
SODIUM SERPL-SCNC: 130 MMOL/L (ref 135–145)
SP GR UR STRIP: 1.03 (ref 1–1.03)
SQUAMOUS EPITHELIAL: <1 /HPF
UROBILINOGEN UR STRIP-MCNC: NORMAL MG/DL
WBC # BLD AUTO: 7.9 10E3/UL (ref 4–11)
WBC URINE: 1 /HPF

## 2025-01-13 PROCEDURE — 250N000012 HC RX MED GY IP 250 OP 636 PS 637: Performed by: STUDENT IN AN ORGANIZED HEALTH CARE EDUCATION/TRAINING PROGRAM

## 2025-01-13 PROCEDURE — 82010 KETONE BODYS QUAN: CPT | Performed by: STUDENT IN AN ORGANIZED HEALTH CARE EDUCATION/TRAINING PROGRAM

## 2025-01-13 PROCEDURE — 36415 COLL VENOUS BLD VENIPUNCTURE: CPT | Performed by: STUDENT IN AN ORGANIZED HEALTH CARE EDUCATION/TRAINING PROGRAM

## 2025-01-13 PROCEDURE — 83036 HEMOGLOBIN GLYCOSYLATED A1C: CPT | Performed by: STUDENT IN AN ORGANIZED HEALTH CARE EDUCATION/TRAINING PROGRAM

## 2025-01-13 PROCEDURE — 82805 BLOOD GASES W/O2 SATURATION: CPT | Performed by: STUDENT IN AN ORGANIZED HEALTH CARE EDUCATION/TRAINING PROGRAM

## 2025-01-13 PROCEDURE — 258N000003 HC RX IP 258 OP 636: Performed by: STUDENT IN AN ORGANIZED HEALTH CARE EDUCATION/TRAINING PROGRAM

## 2025-01-13 PROCEDURE — 80048 BASIC METABOLIC PNL TOTAL CA: CPT | Performed by: STUDENT IN AN ORGANIZED HEALTH CARE EDUCATION/TRAINING PROGRAM

## 2025-01-13 PROCEDURE — 96361 HYDRATE IV INFUSION ADD-ON: CPT

## 2025-01-13 PROCEDURE — 12011 RPR F/E/E/N/L/M 2.5 CM/<: CPT

## 2025-01-13 PROCEDURE — 82962 GLUCOSE BLOOD TEST: CPT

## 2025-01-13 PROCEDURE — 81003 URINALYSIS AUTO W/O SCOPE: CPT | Performed by: STUDENT IN AN ORGANIZED HEALTH CARE EDUCATION/TRAINING PROGRAM

## 2025-01-13 PROCEDURE — 96360 HYDRATION IV INFUSION INIT: CPT

## 2025-01-13 PROCEDURE — 99284 EMERGENCY DEPT VISIT MOD MDM: CPT

## 2025-01-13 PROCEDURE — 85004 AUTOMATED DIFF WBC COUNT: CPT | Performed by: STUDENT IN AN ORGANIZED HEALTH CARE EDUCATION/TRAINING PROGRAM

## 2025-01-13 RX ORDER — LIDOCAINE HYDROCHLORIDE 10 MG/ML
INJECTION, SOLUTION EPIDURAL; INFILTRATION; INTRACAUDAL; PERINEURAL
Status: DISCONTINUED
Start: 2025-01-13 | End: 2025-01-14 | Stop reason: HOSPADM

## 2025-01-13 RX ADMIN — SODIUM CHLORIDE 1000 ML: 9 INJECTION, SOLUTION INTRAVENOUS at 20:14

## 2025-01-13 RX ADMIN — INSULIN ASPART 10 UNITS: 100 INJECTION, SOLUTION INTRAVENOUS; SUBCUTANEOUS at 20:59

## 2025-01-13 ASSESSMENT — ACTIVITIES OF DAILY LIVING (ADL)
ADLS_ACUITY_SCORE: 55

## 2025-01-13 ASSESSMENT — COLUMBIA-SUICIDE SEVERITY RATING SCALE - C-SSRS
2. HAVE YOU ACTUALLY HAD ANY THOUGHTS OF KILLING YOURSELF IN THE PAST MONTH?: NO
6. HAVE YOU EVER DONE ANYTHING, STARTED TO DO ANYTHING, OR PREPARED TO DO ANYTHING TO END YOUR LIFE?: NO
1. IN THE PAST MONTH, HAVE YOU WISHED YOU WERE DEAD OR WISHED YOU COULD GO TO SLEEP AND NOT WAKE UP?: NO

## 2025-01-13 NOTE — ED TRIAGE NOTES
"Pt complains of fall and BS reading \"over 600.\" Pt is not a known diabetic. Pt states her fall was mechanical due to tripping on the \"lip of the door.\" No LOC. No thinners. Of note pt has metastatic breast cancer to back, hips, liver. Pt also has cirrohosis of liver and colitis. Laceration to chin, still bleeding. Lac rewrapped in triage room.      in triage.       "

## 2025-01-14 NOTE — DISCHARGE INSTRUCTIONS
Thank you for allowing us to evaluate you today.  Follow up with primary care clinician  and oncology  in 2 days for reevaluation.  Start taking metformin twice daily as prescribed.  Stop taking alpelisib as per [facility] recommendation.  Follow-up with your primary care clinician about new-onset diabetes mellitus management. You will also need stitches removed in 5-7 days  Please read the guidance provided with your discharge instructions.  Immediately return to the emergency department with any concerns.

## 2025-01-14 NOTE — ED PROVIDER NOTES
Emergency Department Note      History of Present Illness     Chief Complaint   Fall (/) and Hyperglycemia (/)      HPI   Emilie Urena is a very pleasant 49 year old female with history of breast cancer with metastases to the bone and liver, alcohol abuse and cirrhosis with ascites presenting with fall and hyperglycemia.  Patient was at [facility] and tripped over the the lip of a door, hitting her chin on the floor and sustaining a laceration to her chin.  EMS were called.  They tested a point-of-care blood glucose and it was over 600.  She was advised to present to the emergency department.  No LOC.  No use of anticoagulation.  Patient has no pain elsewhere other than her chin.  She reports that one of her chemotherapy medications can be associated with high blood glucose.  No other symptoms.  No prodromal symptoms prior to tripping.    Independent Historian    as detailed above.    Review of External Notes   I personally reviewed notes from the patient's discharge summary dated  11/14/2024 . This provided me with information regarding patient's recent hospitalization.  I personally reviewed notes from the patient's progress note dated  1/6/2025 . This provided me with information regarding  patient's oncologic history .     I personally reviewed the patient's chart, including available medication list and available past medical history, past surgical history, family history, and social history.    Physical Exam     Patient Vitals for the past 24 hrs:   BP Temp Temp src Pulse Resp SpO2 Height Weight   01/13/25 2240 109/81 -- -- 77 -- 100 % -- --   01/13/25 2230 114/84 -- -- 82 -- 100 % -- --   01/13/25 2200 122/83 -- -- 79 -- 100 % -- --   01/13/25 2130 118/82 -- -- 78 -- 100 % -- --   01/13/25 2100 114/84 -- -- 80 -- 100 % -- --   01/13/25 2020 113/86 -- -- -- -- 99 % -- --   01/13/25 1753 (!) 126/96 97.7  F (36.5  C) Oral 97 17 99 % 1.524 m (5') 58.3 kg (128 lb 8.5 oz)     Physical Exam  Vitals and nursing  note reviewed.   Constitutional:       Appearance: Normal appearance. She is not ill-appearing or diaphoretic.   HENT:      Head: No raccoon eyes, Gray's sign, right periorbital erythema or left periorbital erythema.      Jaw: There is normal jaw occlusion.        Nose: Nose normal.      Mouth/Throat:      Mouth: Mucous membranes are moist.   Cardiovascular:      Rate and Rhythm: Normal rate and regular rhythm.   Pulmonary:      Effort: Pulmonary effort is normal.      Breath sounds: Normal breath sounds.   Musculoskeletal:         General: No deformity or signs of injury.      Cervical back: Normal range of motion. No tenderness.   Skin:     General: Skin is warm and dry.      Coloration: Skin is not jaundiced or pale.   Neurological:      Mental Status: She is alert and oriented to person, place, and time.         Diagnostics     Lab Results   Labs Ordered and Resulted from Time of ED Arrival to Time of ED Departure   GLUCOSE BY METER - Abnormal       Result Value    GLUCOSE BY METER POCT 581 (*)    BASIC METABOLIC PANEL - Abnormal    Sodium 130 (*)     Potassium 4.5      Chloride 91 (*)     Carbon Dioxide (CO2) 22      Anion Gap 17 (*)     Urea Nitrogen 30.6 (*)     Creatinine 1.26 (*)     GFR Estimate 53 (*)     Calcium 9.5      Glucose 607 (*)    ROUTINE UA WITH MICROSCOPIC REFLEX TO CULTURE - Abnormal    Color Urine Light Yellow      Appearance Urine Clear      Glucose Urine >=1000 (*)     Bilirubin Urine Negative      Ketones Urine Negative      Specific Gravity Urine 1.028      Blood Urine Negative      pH Urine 5.0      Protein Albumin Urine Negative      Urobilinogen Urine Normal      Nitrite Urine Negative      Leukocyte Esterase Urine Negative      RBC Urine 1      WBC Urine 1      Squamous Epithelials Urine <1     BLOOD GAS VENOUS - Abnormal    pH Venous 7.40      pCO2 Venous 47      pO2 Venous 28      Bicarbonate Venous 29 (*)     Base Excess/Deficit Venous 2.9      FIO2 21      Oxyhemoglobin Venous  40 (*)     O2 Sat, Venous 40.3 (*)    CBC WITH PLATELETS AND DIFFERENTIAL - Abnormal    WBC Count 7.9      RBC Count 3.44 (*)     Hemoglobin 12.9      Hematocrit 37.1       (*)     MCH 37.5 (*)     MCHC 34.8      RDW 12.7      Platelet Count 193      % Neutrophils 91      % Lymphocytes 3      % Monocytes 5      % Eosinophils 0      % Basophils 0      % Immature Granulocytes 1      NRBCs per 100 WBC 0      Absolute Neutrophils 7.2      Absolute Lymphocytes 0.2 (*)     Absolute Monocytes 0.4      Absolute Eosinophils 0.0      Absolute Basophils 0.0      Absolute Immature Granulocytes 0.1      Absolute NRBCs 0.0     HEMOGLOBIN A1C - Abnormal    Estimated Average Glucose 151 (*)     Hemoglobin A1C 6.9 (*)    GLUCOSE BY METER - Abnormal    GLUCOSE BY METER POCT 431 (*)    KETONE BETA-HYDROXYBUTYRATE QUANTITATIVE, RAPID - Normal    Ketone (Beta-Hydroxybutyrate) Quantitative <0.18     GLUCOSE MONITOR NURSING POCT       Imaging   No orders to display       EKG   No ECG performed.     Independent Interpretation - See ED Course Below    ED Course      Medications Administered   Medications   sodium chloride 0.9% BOLUS 1,000 mL (0 mLs Intravenous Stopped 1/13/25 2235)   insulin aspart (NovoLOG) injection (RAPID ACTING) (10 Units Subcutaneous $Given 1/13/25 2059)       Procedures   Procedures     Laceration Repair      Procedure: Laceration Repair    Indication: Laceration    Consent: Verbal    Tetanus status reviewed 2017    Location: Midline chin    Length: 1.5 cm    Preparation: Irrigation with Sterile Saline and Pressure device utilized.    Anesthesia/Sedation: Lidocaine - 1%      Treatment/Exploration: Wound explored, no foreign bodies found     Closure: The wound was closed with one layer. Skin/superficial layer was closed with 3 x 5-0 Nylon using Interrupted sutures.     Patient Status: The patient tolerated the procedure well: Yes. There were no complications.    Discussion of Management - See ED Course  Below    ED Course   Independent Interpretation / Discussion of Management / Repeat Assessments  ED Course as of 01/14/25 0051   Mon Jan 13, 2025   1900 I discussed the patient's presentation, assessment and plan with Dr. Graham of [facility].  She explained that the patient's medication alpelisib can cause new onset diabetes.  She recommended the patient's stop taking this medication.  She recommended we treat new onset diabetes as per any of the patient.  She will try to arrange follow-up for the patient with oncology in the next 2 days.     2000 I discussed the patient's presentation and history with our emergency department pharmacist and discussed appropriate medical management of patient's condition(s).         Additional Documentation  None    Medical Decision Making / Diagnosis     CMS Diagnoses: None    MIPS       None    MDM   Patient presenting with fall, chin laceration, elevated blood glucose.  Vital signs are reassuring.  Patient is asymptomatic other than pain in her chin.  There were no signs of other traumatic injury to the face.  Do not feel that advanced imaging of the head is indicated at this time.  Patient's laceration was cleaned and closed with sutures as above.  Regarding patient's blood glucose being elevated, we obtained labs here, showing hyperglycemia but no evidence of DKA.  Patient's A1c is 6.9.  I discussed with our emergency department pharmacist.  We gave the patient a bolus of fluid and then 10 units of short acting insulin.  Given patient's A1c is not significantly elevated, she may be able to avoid starting insulin, so we will plan to start patient on metformin twice daily and have patient follow-up with her primary care clinician within 2 days for reevaluation.  Per oncology recommendations, also advised her to stop taking alpelisib.  She should follow-up with oncology within the next 2 days.  She also will need sutures out in 5 to 7 days.  Return precautions were  provided.    Disposition   The patient was discharged.     Diagnosis     ICD-10-CM    1. Diabetes mellitus, new onset (H)  E11.9       2. Hyperglycemia  R73.9       3. Acute kidney injury  N17.9       4. Fall from slip, trip, or stumble, initial encounter  W01.0XXA       5. Chin laceration, initial encounter  S01.81XA            Discharge Medications   Discharge Medication List as of 1/13/2025 10:35 PM             Surjit Lombardo MD  01/14/25 0052

## 2025-01-14 NOTE — ED NOTES
Pt discharged. Discharged instructions and papers given. Discharged on stable condition, A&Ox4, ambulatory

## 2025-01-15 LAB
PATH REPORT.ADDENDUM SPEC: NORMAL
PATH REPORT.COMMENTS IMP SPEC: NORMAL
PATH REPORT.FINAL DX SPEC: NORMAL
PATH REPORT.GROSS SPEC: NORMAL
PATH REPORT.MICROSCOPIC SPEC OTHER STN: NORMAL
PATH REPORT.RELEVANT HX SPEC: NORMAL

## 2025-01-19 ENCOUNTER — HEALTH MAINTENANCE LETTER (OUTPATIENT)
Age: 50
End: 2025-01-19

## 2025-04-27 ENCOUNTER — HEALTH MAINTENANCE LETTER (OUTPATIENT)
Age: 50
End: 2025-04-27

## 2025-08-10 ENCOUNTER — HEALTH MAINTENANCE LETTER (OUTPATIENT)
Age: 50
End: 2025-08-10